# Patient Record
Sex: MALE | Race: BLACK OR AFRICAN AMERICAN
[De-identification: names, ages, dates, MRNs, and addresses within clinical notes are randomized per-mention and may not be internally consistent; named-entity substitution may affect disease eponyms.]

---

## 2017-01-01 ENCOUNTER — HOSPITAL (OUTPATIENT)
Dept: OTHER | Age: 72
End: 2017-01-01
Attending: INTERNAL MEDICINE

## 2017-01-03 LAB
ANNOTATION COMMENT IMP: ABNORMAL
ANNOTATION COMMENT IMP: NEGATIVE
ATP BLD-MCNC: 374 NG/ML
CMV IGG SERPL IA-ACNC: 3.97 ISR
CMV IGM SERPL IA-ACNC: 0.38 OD
EBV VCA IGG SER-ACNC: >8 AI (ref 0–0.8)
EBV VCA IGM SER-ACNC: <0.2 AI (ref 0–0.8)
HAV IGG+IGM SER QL: NEGATIVE
HBV CORE IGG+IGM SER QL: NEGATIVE
HBV SURFACE AB SER QL: POSITIVE
HBV SURFACE AG SER QL: NEGATIVE
HCV AB SERPL QL IA: NEGATIVE
HIV ANTIGEN/ANTIBODY SCREEN: NONREACTIVE
M TB IFN-G CD4+ BCKGRND COR BLD-ACNC: 0.04 UNIT/ML
M TB IFN-G CD4+ T-CELLS BLD-ACNC: 0.03 UNIT/ML
M TB TUBERC IGNF/MITOGEN IGNF CONTROL: >10 UNIT/ML
MEV IGG SER QL IA: 2.67 OD
MUV IGG SER IA-ACNC: 3.92 OD
RPR SER QL: NONREACTIVE
RUBV IGG SERPL IA-ACNC: >500 UNIT/ML
VARICELLA ZOSTER IGG: 3.4 OD

## 2017-02-01 ENCOUNTER — HOSPITAL (OUTPATIENT)
Dept: OTHER | Age: 72
End: 2017-02-01
Attending: INTERNAL MEDICINE

## 2018-06-11 ENCOUNTER — HOSPITAL (OUTPATIENT)
Dept: OTHER | Age: 73
End: 2018-06-11

## 2018-07-01 ENCOUNTER — HOSPITAL (OUTPATIENT)
Dept: OTHER | Age: 73
End: 2018-07-01

## 2018-08-01 ENCOUNTER — HOSPITAL (OUTPATIENT)
Dept: OTHER | Age: 73
End: 2018-08-01

## 2019-10-16 RX ORDER — LISINOPRIL 20 MG/1
TABLET ORAL
COMMUNITY
Start: 2017-08-01 | End: 2019-10-17 | Stop reason: SDUPTHER

## 2019-10-17 ENCOUNTER — OFFICE VISIT (OUTPATIENT)
Dept: FAMILY MEDICINE | Facility: CLINIC | Age: 74
End: 2019-10-17
Payer: MEDICARE

## 2019-10-17 DIAGNOSIS — I10 HYPERTENSION, ESSENTIAL: ICD-10-CM

## 2019-10-17 DIAGNOSIS — E78.5 HYPERLIPIDEMIA, UNSPECIFIED HYPERLIPIDEMIA TYPE: ICD-10-CM

## 2019-10-17 DIAGNOSIS — Z12.11 ENCOUNTER FOR SCREENING COLONOSCOPY: ICD-10-CM

## 2019-10-17 DIAGNOSIS — Z11.59 NEED FOR HEPATITIS C SCREENING TEST: ICD-10-CM

## 2019-10-17 DIAGNOSIS — Z13.220 ENCOUNTER FOR LIPID SCREENING FOR CARDIOVASCULAR DISEASE: ICD-10-CM

## 2019-10-17 DIAGNOSIS — Z13.6 ENCOUNTER FOR LIPID SCREENING FOR CARDIOVASCULAR DISEASE: ICD-10-CM

## 2019-10-17 DIAGNOSIS — Z79.899 ENCOUNTER FOR LONG-TERM (CURRENT) USE OF MEDICATIONS: ICD-10-CM

## 2019-10-17 DIAGNOSIS — Z00.01 ENCOUNTER FOR GENERAL ADULT MEDICAL EXAMINATION WITH ABNORMAL FINDINGS: Primary | ICD-10-CM

## 2019-10-17 PROCEDURE — 99999 PR PBB SHADOW E&M-NEW PATIENT-LVL IV: ICD-10-PCS | Mod: PBBFAC,,, | Performed by: FAMILY MEDICINE

## 2019-10-17 PROCEDURE — 99499 UNLISTED E&M SERVICE: CPT | Mod: S$GLB,,, | Performed by: FAMILY MEDICINE

## 2019-10-17 PROCEDURE — 99999 PR PBB SHADOW E&M-NEW PATIENT-LVL IV: CPT | Mod: PBBFAC,,, | Performed by: FAMILY MEDICINE

## 2019-10-17 PROCEDURE — 99204 PR OFFICE/OUTPT VISIT, NEW, LEVL IV, 45-59 MIN: ICD-10-PCS | Mod: S$GLB,,, | Performed by: FAMILY MEDICINE

## 2019-10-17 PROCEDURE — 99499 RISK ADDL DX/OHS AUDIT: ICD-10-PCS | Mod: S$GLB,,, | Performed by: FAMILY MEDICINE

## 2019-10-17 PROCEDURE — 99204 OFFICE O/P NEW MOD 45 MIN: CPT | Mod: S$GLB,,, | Performed by: FAMILY MEDICINE

## 2019-10-17 RX ORDER — ASCORBIC ACID 500 MG
500 TABLET ORAL DAILY
COMMUNITY

## 2019-10-17 RX ORDER — MAG HYDROX/ALUMINUM HYD/SIMETH 400-400-40
4 SUSPENSION, ORAL (FINAL DOSE FORM) ORAL
COMMUNITY

## 2019-10-17 RX ORDER — ZINC GLUCONATE 50 MG
50 TABLET ORAL DAILY
COMMUNITY

## 2019-10-17 RX ORDER — FOLIC ACID 1 MG/1
1 TABLET ORAL DAILY
COMMUNITY

## 2019-10-17 RX ORDER — LISINOPRIL 20 MG/1
20 TABLET ORAL DAILY
Qty: 90 TABLET | Refills: 3 | Status: SHIPPED | OUTPATIENT
Start: 2019-10-17 | End: 2020-09-23 | Stop reason: SDUPTHER

## 2019-10-17 RX ORDER — ATORVASTATIN CALCIUM 40 MG/1
40 TABLET, FILM COATED ORAL DAILY
Qty: 90 TABLET | Refills: 3 | Status: SHIPPED | OUTPATIENT
Start: 2019-10-17 | End: 2019-10-18

## 2019-10-17 RX ORDER — AMOXICILLIN 500 MG
CAPSULE ORAL DAILY
COMMUNITY

## 2019-10-17 RX ORDER — ASPIRIN 81 MG/1
81 TABLET ORAL DAILY
COMMUNITY
End: 2020-09-23 | Stop reason: SDUPTHER

## 2019-10-17 NOTE — PROGRESS NOTES
======================================================  Patient new to my practice.    Previous PCP: Dr. Austin  Specialists: Dr. Padilla- Urologist  Recent lab work:  Early 2019  Recent imaging: none  Colonoscopy History: none     Health Maintenance Due   Topic Date Due    TETANUS VACCINE  08/26/1965    Colonoscopy  08/26/1997    Pneumococcal Vaccine (65+ Low/Medium Risk) (1 of 2 - PCV13) 08/26/2012       Goal of current visit:  Wellness visit  ======================================================      Subjective:      Patient ID: Trell Rios is a 74 y.o. male.  has a past medical history of Hypertension.     Chief Complaint: Establish Care      Problem List Items Addressed This Visit     Encounter for long-term (current) use of medications    Overview       Patient is on CHRONIC long-term drug therapy for managed conditions. See medication list. Reports compliance.  No side effects reported.  Routine lab work is being monitored.  Patient does need refills today.     Lab Results   Component Value Date    WBC 4.8 10/17/2019    HGB 15.7 10/17/2019    HCT 45.3 10/17/2019    MCV 96.6 10/17/2019     10/17/2019         Chemistry        Component Value Date/Time     10/17/2019 1200    K 4.7 10/17/2019 1200     10/17/2019 1200    CO2 30 10/17/2019 1200    BUN 20 10/17/2019 1200    CREATININE 0.95 10/17/2019 1200    GLU 88 10/17/2019 1200        Component Value Date/Time    CALCIUM 10.1 10/17/2019 1200    ALKPHOS 99 10/17/2019 1200    AST 28 10/17/2019 1200    ALT 43 10/17/2019 1200    BILITOT 0.8 10/17/2019 1200    ESTGFRAFRICA 92 10/17/2019 1200    EGFRNONAA 80 10/17/2019 1200          No results found for: TSH, C0ZRZGU, X6VYBCK, THYROIDAB, FREET4, T3FREE           Current Assessment & Plan     Complete history and physical was completed today.  Complete and thorough medication reconciliation was performed.  Discussed risks and benefits of medications.  Advised patient on orders and health  maintenance.  We discussed old records and old labs if available.  Will request any records not available through epic.  Continue current medications listed on your summary sheet.           Hypertension, essential    Overview       This condition is chronic.  Currently borderline.  Blood pressure is initially above goal.  Patient reports compliance with blood pressure medications as listed.  No side effects are reported at this time.    However patient did not take his blood pressure medication this morning or last night due to fasting.    The patient denies any chest pain, shortness of breath, palpitations, dizziness, lightheadedness, headache, arm numbness tingling or weakness, syncope.    Creatinine   Date Value Ref Range Status   10/17/2019 0.95 0.70 - 1.18 mg/dL Final     Comment:     For patients >49 years of age, the reference limit  for Creatinine is approximately 13% higher for people  identified as -American.                   Current Assessment & Plan     Patient signed up for [facility] blood pressure monitoring.  Continue blood pressure medications.  Encourage compliance.Discussed hypertension disease course.  Discussed-DASH-diet and exercise.  Discussed medication regimen importance of treating high blood pressure.  Patient understood and advised of risk of untreated blood pressure.  ER precautions were given for symptoms of hypertensive urgency and emergency.           Hyperlipidemia    Overview       This condition is chronic.  Currently stable.  Patient reports compliance with hyperlipidemia treatment as prescribed.  No side effects reported at this time.    Lab Results   Component Value Date    CHOL 118 10/17/2019     Lab Results   Component Value Date    HDL 54 10/17/2019     Lab Results   Component Value Date    LDLCALC 41 10/17/2019     Lab Results   Component Value Date    TRIG 145 10/17/2019     Lab Results   Component Value Date    CHOLHDL 2.2 10/17/2019     No results found for:  TOTALCHOLEST      Lab Results   Component Value Date    ALT 43 10/17/2019    AST 28 10/17/2019    ALKPHOS 99 10/17/2019    BILITOT 0.8 10/17/2019       The patient denies any myalgias, chest pain, shortness of breath, abdominal pain, nausea, vomiting, constipation, diarrhea, jaundice, skin changes at this time.           Current Assessment & Plan     After reviewing the patient's numbers and risk scores will decrease patient's Lipitor to 20 mg daily.  Plan to repeat lipid panel in 3 to 6 months.    Discussed hyperlipidemia disease course.  Discussed the risk of cardiovascular disease, increase stroke and heart attack risk.  Patient voiced understanding and understood the treatment plan. All questions were answered.  Discussed healthy diet and increased need for exercise.           Encounter for lipid screening for cardiovascular disease    Need for hepatitis C screening test    Overview     Patient is due for hepatitis C screening.   Patient advised of risk of zeinab hepatitis C including being Born between 1945 and 1965, blood transfusions prior to 1992, IV or nasal drug use, tattoos, sexual intercourse.  Patient denies being tested.  Patient denies known history of hepatitis-C.    Lab Results   Component Value Date    HEPCAB NON-REACTIVE 10/17/2019              Encounter for screening colonoscopy    Overview     Patient elects to Cologuard         Current Assessment & Plan     Cologuard screening.         Encounter for general adult medical examination with abnormal findings - Primary    Overview     Well Adult Physical: Patient here for a comprehensive physical exam.The patient reports problems - Here to update health maintenance.  Do you take any herbs or supplements that were not prescribed by a doctor? no Are you taking calcium supplements? no Are you taking aspirin daily? yes   History:  Any STD's in the past? none     Last PSA was within normal limits.           Current Assessment & Plan     Plan to  recheck PSA in 2020.    Complete history and physical was completed today.  Complete and thorough medication reconciliation was performed.  Discussed risks and benefits of medications.  Advised patient on orders and health maintenance.  We discussed old records and old labs if available.  Will request any records not available through epic.  Continue current medications listed on your summary sheet.                  Past Medical History:  Past Medical History:   Diagnosis Date    Hypertension      Past Surgical History:   Procedure Laterality Date    HERNIA REPAIR       Review of patient's allergies indicates:   Allergen Reactions    Sulfa (sulfonamide antibiotics) Rash     Medication List with Changes/Refills   Current Medications    ASCORBIC ACID, VITAMIN C, (VITAMIN C) 500 MG TABLET    Take 500 mg by mouth once daily.    ASPIRIN (ECOTRIN) 81 MG EC TABLET    Take 81 mg by mouth once daily.    FISH OIL-OMEGA-3 FATTY ACIDS 300-1,000 MG CAPSULE    Take by mouth once daily.    FOLIC ACID (FOLVITE) 1 MG TABLET    Take 1 mg by mouth once daily.    SAW PALMETTO FRUIT 450 MG CAP    Take 4 capsules by mouth.    ZINC GLUCONATE 50 MG TABLET    Take 50 mg by mouth once daily.   Changed and/or Refilled Medications    Modified Medication Previous Medication    ATORVASTATIN (LIPITOR) 20 MG TABLET atorvastatin calcium (ATORVASTATIN ORAL)       Take 1 tablet (20 mg total) by mouth once daily.        LISINOPRIL (PRINIVIL,ZESTRIL) 20 MG TABLET lisinopril (PRINIVIL,ZESTRIL) 20 MG tablet       Take 1 tablet (20 mg total) by mouth once daily.          Social History     Tobacco Use    Smoking status: Never Smoker    Smokeless tobacco: Never Used   Substance Use Topics    Alcohol use: Never     Frequency: Never      History reviewed. No pertinent family history.    I have reviewed the complete PMH, social history, surgical history, allergies and medications.  As well as family history.    Review of Systems   Constitutional:  Negative for activity change and unexpected weight change.   HENT: Negative for hearing loss, rhinorrhea and trouble swallowing.    Eyes: Negative for discharge and visual disturbance.   Respiratory: Negative for chest tightness and wheezing.    Cardiovascular: Negative for chest pain and palpitations.   Gastrointestinal: Negative for blood in stool, constipation, diarrhea and vomiting.   Endocrine: Negative for polydipsia and polyuria.   Genitourinary: Negative for difficulty urinating, hematuria and urgency.   Musculoskeletal: Positive for arthralgias. Negative for joint swelling and neck pain.   Neurological: Negative for weakness and headaches.   Psychiatric/Behavioral: Negative for dysphoric mood.       Objective:     /88 (BP Location: Left arm, Patient Position: Sitting, BP Method: Medium (Automatic)) Comment: did not take medication this morning.  Pulse 63   Temp 97.7 °F (36.5 °C)   Ht 6' (1.829 m)   Wt 83.7 kg (184 lb 9.6 oz)   BMI 25.04 kg/m²     Physical Exam   Constitutional: He is oriented to person, place, and time. He appears well-developed and well-nourished. No distress.   HENT:   Head: Normocephalic and atraumatic.   Eyes: Pupils are equal, round, and reactive to light. EOM are normal.   Neck: Normal range of motion. Neck supple.   Cardiovascular: Normal rate, regular rhythm, normal heart sounds and intact distal pulses.   No murmur heard.  Pulmonary/Chest: Effort normal and breath sounds normal. No respiratory distress. He has no wheezes.   Musculoskeletal: Normal range of motion. He exhibits no edema.   Neurological: He is alert and oriented to person, place, and time. No cranial nerve deficit.   Skin: Skin is warm and dry. Capillary refill takes less than 2 seconds.   Psychiatric: He has a normal mood and affect. His behavior is normal.   Nursing note and vitals reviewed.      Assessment:     1. Encounter for general adult medical examination with abnormal findings    2. Encounter for  long-term (current) use of medications    3. Hypertension, essential    4. Hyperlipidemia, unspecified hyperlipidemia type    5. Encounter for lipid screening for cardiovascular disease    6. Need for hepatitis C screening test    7. Encounter for screening colonoscopy        Plan:     I have Reviewed and summarized old records.  I have performed thorough medication reconciliation today and discussed risk and benefits of each medication.  I have reviewed labs and discussed with patient.  All questions were answered.  I am requesting old records and will review them once they are available.    Problem List Items Addressed This Visit     Encounter for long-term (current) use of medications     Complete history and physical was completed today.  Complete and thorough medication reconciliation was performed.  Discussed risks and benefits of medications.  Advised patient on orders and health maintenance.  We discussed old records and old labs if available.  Will request any records not available through epic.  Continue current medications listed on your summary sheet.           Relevant Orders    Hepatitis C antibody (Completed)    CBC auto differential (Completed)    Comprehensive metabolic panel (Completed)    Hypertension, essential     Patient signed up for [facility] blood pressure monitoring.  Continue blood pressure medications.  Encourage compliance.Discussed hypertension disease course.  Discussed-DASH-diet and exercise.  Discussed medication regimen importance of treating high blood pressure.  Patient understood and advised of risk of untreated blood pressure.  ER precautions were given for symptoms of hypertensive urgency and emergency.           Relevant Medications    lisinopril (PRINIVIL,ZESTRIL) 20 MG tablet    Other Relevant Orders    MyChart Patient Entered Blood Pressure    Hepatitis C antibody (Completed)    CBC auto differential (Completed)    Comprehensive metabolic panel (Completed)    Lipid panel  (Completed)    Hyperlipidemia     After reviewing the patient's numbers and risk scores will decrease patient's Lipitor to 20 mg daily.  Plan to repeat lipid panel in 3 to 6 months.    Discussed hyperlipidemia disease course.  Discussed the risk of cardiovascular disease, increase stroke and heart attack risk.  Patient voiced understanding and understood the treatment plan. All questions were answered.  Discussed healthy diet and increased need for exercise.           Relevant Orders    Lipid panel (Completed)    Encounter for lipid screening for cardiovascular disease    Need for hepatitis C screening test    Relevant Orders    Hepatitis C antibody (Completed)    Encounter for screening colonoscopy     Cologuard screening.         Relevant Orders    Cologuard Screening (Multitarget Stool DNA)    Encounter for general adult medical examination with abnormal findings - Primary     Plan to recheck PSA in 2020.    Complete history and physical was completed today.  Complete and thorough medication reconciliation was performed.  Discussed risks and benefits of medications.  Advised patient on orders and health maintenance.  We discussed old records and old labs if available.  Will request any records not available through epic.  Continue current medications listed on your summary sheet.                 Future Appointments     Date Provider Specialty Appt Notes    4/17/2020 Elias Rowan MD Family Medicine 6 month F/U            Follow up in about 6 months (around 4/17/2020), or if symptoms worsen or fail to improve, for HTN, HLD.    If no improvement in symptoms or symptoms worsen, advised to call/follow-up at clinic or go to ER. Patient voiced understanding and all questions/concerns were addressed.     DISCLAIMER: This note was compiled by using a speech recognition dictation system and therefore please be aware that typographical / speech recognition errors can and do occur.  Please contact me if you see any  errors specifically.    Elias Rowan MD  We Offer Telehealth & Same Day Appointments!   Book your Telehealth appointment with me through my nurse or   Clinic appointments on [employer]!    Office: [phone]          Check out my Facebook Page and Follow Me at: CLICK HERE    Check out my website at [employer] by clicking on: CLICK HERE    To Schedule appointments online, go to [employer]: CLICK HERE     Location: [email or web address]    [address] [place] [address]    FAX: [phone]

## 2019-10-17 NOTE — Clinical Note
Patient needs Cologuard sent if not already done.  We need records from [facility] for labs done earlier this year.

## 2019-10-18 DIAGNOSIS — Z79.899 ENCOUNTER FOR LONG-TERM (CURRENT) USE OF MEDICATIONS: Primary | ICD-10-CM

## 2019-10-18 DIAGNOSIS — Z00.01 ENCOUNTER FOR GENERAL ADULT MEDICAL EXAMINATION WITH ABNORMAL FINDINGS: ICD-10-CM

## 2019-10-18 DIAGNOSIS — E78.5 HYPERLIPIDEMIA, UNSPECIFIED HYPERLIPIDEMIA TYPE: ICD-10-CM

## 2019-10-18 LAB
ALBUMIN SERPL-MCNC: 4.6 G/DL (ref 3.6–5.1)
ALBUMIN/GLOB SERPL: 1.6 (CALC) (ref 1–2.5)
ALP SERPL-CCNC: 99 U/L (ref 40–115)
ALT SERPL-CCNC: 43 U/L (ref 9–46)
AST SERPL-CCNC: 28 U/L (ref 10–35)
BASOPHILS # BLD AUTO: 53 CELLS/UL (ref 0–200)
BASOPHILS NFR BLD AUTO: 1.1 %
BILIRUB SERPL-MCNC: 0.8 MG/DL (ref 0.2–1.2)
BUN SERPL-MCNC: 20 MG/DL (ref 7–25)
BUN/CREAT SERPL: NORMAL (CALC) (ref 6–22)
CALCIUM SERPL-MCNC: 10.1 MG/DL (ref 8.6–10.3)
CHLORIDE SERPL-SCNC: 102 MMOL/L (ref 98–110)
CHOLEST SERPL-MCNC: 118 MG/DL
CHOLEST/HDLC SERPL: 2.2 (CALC)
CO2 SERPL-SCNC: 30 MMOL/L (ref 20–32)
CREAT SERPL-MCNC: 0.95 MG/DL (ref 0.7–1.18)
EOSINOPHIL # BLD AUTO: 130 CELLS/UL (ref 15–500)
EOSINOPHIL NFR BLD AUTO: 2.7 %
ERYTHROCYTE [DISTWIDTH] IN BLOOD BY AUTOMATED COUNT: 12.8 % (ref 11–15)
GFRSERPLBLD MDRD-ARVRAT: 80 ML/MIN/1.73M2
GLOBULIN SER CALC-MCNC: 2.9 G/DL (CALC) (ref 1.9–3.7)
GLUCOSE SERPL-MCNC: 88 MG/DL (ref 65–99)
HCT VFR BLD AUTO: 45.3 % (ref 38.5–50)
HCV AB S/CO SERPL IA: 0.02
HCV AB SERPL QL IA: NORMAL
HDLC SERPL-MCNC: 54 MG/DL
HGB BLD-MCNC: 15.7 G/DL (ref 13.2–17.1)
LDLC SERPL CALC-MCNC: 41 MG/DL (CALC)
LYMPHOCYTES # BLD AUTO: 1656 CELLS/UL (ref 850–3900)
LYMPHOCYTES NFR BLD AUTO: 34.5 %
MCH RBC QN AUTO: 33.5 PG (ref 27–33)
MCHC RBC AUTO-ENTMCNC: 34.7 G/DL (ref 32–36)
MCV RBC AUTO: 96.6 FL (ref 80–100)
MONOCYTES # BLD AUTO: 566 CELLS/UL (ref 200–950)
MONOCYTES NFR BLD AUTO: 11.8 %
NEUTROPHILS # BLD AUTO: 2395 CELLS/UL (ref 1500–7800)
NEUTROPHILS NFR BLD AUTO: 49.9 %
NONHDLC SERPL-MCNC: 64 MG/DL (CALC)
PLATELET # BLD AUTO: 205 THOUSAND/UL (ref 140–400)
PMV BLD REES-ECKER: 9.8 FL (ref 7.5–12.5)
POTASSIUM SERPL-SCNC: 4.7 MMOL/L (ref 3.5–5.3)
PROT SERPL-MCNC: 7.5 G/DL (ref 6.1–8.1)
RBC # BLD AUTO: 4.69 MILLION/UL (ref 4.2–5.8)
SODIUM SERPL-SCNC: 140 MMOL/L (ref 135–146)
TRIGL SERPL-MCNC: 145 MG/DL
WBC # BLD AUTO: 4.8 THOUSAND/UL (ref 3.8–10.8)

## 2019-10-18 RX ORDER — ATORVASTATIN CALCIUM 20 MG/1
20 TABLET, FILM COATED ORAL DAILY
Qty: 90 TABLET | Refills: 3 | Status: SHIPPED | OUTPATIENT
Start: 2019-10-18 | End: 2020-09-23 | Stop reason: SDUPTHER

## 2019-10-18 NOTE — PROGRESS NOTES
CALL THE PATIENT to discuss results and document verification.   Make sure that Cologuard form was submitted.  Set up lipid panel for six months.    I have sent a message to them with the interpretation (see below).  See if they have any questions and make a follow-up appointment if not already schedule and if needed.    Also please address any outstanding health maintenance that may be due: TETANUS VACCINE due on 08/26/1965  Colonoscopy due on 08/26/1997  Pneumococcal Vaccine (65+ Low/Medium Risk)(1 of 2 - PCV13) due on 08/26/2012  ====================================    My interpretation that was sent to them through [employer]:    I have reviewed your recent blood work.  Excellent blood work!    Your complete blood count is stable within normal limits.    Your metabolic panel which shows your glucose, kidney function, electrolytes, and liver function is stable and within normal limits.   Your cholesterol is greatly improved..  Your LDL is too low.  Decrease atorvastatin to 20 mg daily.  Recheck levels in six months    Please reply so that we know that you have received this message.

## 2019-10-26 VITALS
WEIGHT: 184.63 LBS | SYSTOLIC BLOOD PRESSURE: 138 MMHG | HEIGHT: 72 IN | BODY MASS INDEX: 25.01 KG/M2 | HEART RATE: 63 BPM | DIASTOLIC BLOOD PRESSURE: 88 MMHG | TEMPERATURE: 98 F

## 2019-10-26 PROBLEM — Z00.01 ENCOUNTER FOR GENERAL ADULT MEDICAL EXAMINATION WITH ABNORMAL FINDINGS: Status: ACTIVE | Noted: 2019-10-26

## 2019-10-26 NOTE — ASSESSMENT & PLAN NOTE
[facility]  Continue blood pressure medications.  Encourage compliance.Discussed hypertension disease course.  Discussed-DASH-diet and exercise.  Discussed medication regimen importance of treating high blood pressure.  Patient understood and advised of risk of untreated blood pressure.  ER precautions were given for symptoms of hypertensive urgency and emergency.

## 2019-10-26 NOTE — ASSESSMENT & PLAN NOTE
Plan to recheck PSA in 2020.    Complete history and physical was completed today.  Complete and thorough medication reconciliation was performed.  Discussed risks and benefits of medications.  Advised patient on orders and health maintenance.  We discussed old records and old labs if available.  Will request any records not available through epic.  Continue current medications listed on your summary sheet.

## 2019-10-26 NOTE — ASSESSMENT & PLAN NOTE
After reviewing the patient's numbers and risk scores will decrease patient's Lipitor to 20 mg daily.  Plan to repeat lipid panel in 3 to 6 months.    Discussed hyperlipidemia disease course.  Discussed the risk of cardiovascular disease, increase stroke and heart attack risk.  Patient voiced understanding and understood the treatment plan. All questions were answered.  Discussed healthy diet and increased need for exercise.

## 2019-12-10 DIAGNOSIS — Z79.899 ENCOUNTER FOR LONG-TERM (CURRENT) USE OF MEDICATIONS: Primary | ICD-10-CM

## 2019-12-10 RX ORDER — CEFDINIR 300 MG/1
300 CAPSULE ORAL 2 TIMES DAILY
Qty: 28 CAPSULE | Refills: 0 | Status: SHIPPED | OUTPATIENT
Start: 2019-12-10 | End: 2020-02-01 | Stop reason: ALTCHOICE

## 2020-01-20 PROBLEM — Z13.220 ENCOUNTER FOR LIPID SCREENING FOR CARDIOVASCULAR DISEASE: Status: RESOLVED | Noted: 2019-10-17 | Resolved: 2020-01-20

## 2020-01-20 PROBLEM — Z13.6 ENCOUNTER FOR LIPID SCREENING FOR CARDIOVASCULAR DISEASE: Status: RESOLVED | Noted: 2019-10-17 | Resolved: 2020-01-20

## 2020-01-20 LAB — HEMOCCULT STL QL IA: NEGATIVE

## 2020-01-27 PROBLEM — Z00.01 ENCOUNTER FOR GENERAL ADULT MEDICAL EXAMINATION WITH ABNORMAL FINDINGS: Status: RESOLVED | Noted: 2019-10-26 | Resolved: 2020-01-27

## 2020-01-29 ENCOUNTER — PATIENT OUTREACH (OUTPATIENT)
Dept: ADMINISTRATIVE | Facility: HOSPITAL | Age: 75
End: 2020-01-29

## 2020-02-01 DIAGNOSIS — A49.9 BACTERIAL INFECTION: Primary | ICD-10-CM

## 2020-02-01 RX ORDER — NITROFURANTOIN 25; 75 MG/1; MG/1
100 CAPSULE ORAL 2 TIMES DAILY
Qty: 28 CAPSULE | Refills: 0 | Status: SHIPPED | OUTPATIENT
Start: 2020-02-01 | End: 2020-02-04 | Stop reason: ALTCHOICE

## 2020-04-02 ENCOUNTER — PATIENT MESSAGE (OUTPATIENT)
Dept: FAMILY MEDICINE | Facility: CLINIC | Age: 75
End: 2020-04-02

## 2020-04-16 DIAGNOSIS — Z79.899 ENCOUNTER FOR LONG-TERM (CURRENT) USE OF MEDICATIONS: Primary | ICD-10-CM

## 2020-04-16 DIAGNOSIS — Z13.220 ENCOUNTER FOR LIPID SCREENING FOR CARDIOVASCULAR DISEASE: ICD-10-CM

## 2020-04-16 DIAGNOSIS — Z13.6 ENCOUNTER FOR LIPID SCREENING FOR CARDIOVASCULAR DISEASE: ICD-10-CM

## 2020-04-16 DIAGNOSIS — Z00.01 ENCOUNTER FOR GENERAL ADULT MEDICAL EXAMINATION WITH ABNORMAL FINDINGS: ICD-10-CM

## 2020-07-20 PROBLEM — Z00.01 ENCOUNTER FOR GENERAL ADULT MEDICAL EXAMINATION WITH ABNORMAL FINDINGS: Status: RESOLVED | Noted: 2019-10-26 | Resolved: 2020-07-20

## 2020-09-23 ENCOUNTER — OFFICE VISIT (OUTPATIENT)
Dept: FAMILY MEDICINE | Facility: CLINIC | Age: 75
End: 2020-09-23
Payer: MEDICARE

## 2020-09-23 DIAGNOSIS — E78.5 HYPERLIPIDEMIA, UNSPECIFIED HYPERLIPIDEMIA TYPE: ICD-10-CM

## 2020-09-23 DIAGNOSIS — Z13.220 ENCOUNTER FOR LIPID SCREENING FOR CARDIOVASCULAR DISEASE: ICD-10-CM

## 2020-09-23 DIAGNOSIS — Z12.5 ENCOUNTER FOR PROSTATE CANCER SCREENING: ICD-10-CM

## 2020-09-23 DIAGNOSIS — Z13.6 ENCOUNTER FOR LIPID SCREENING FOR CARDIOVASCULAR DISEASE: ICD-10-CM

## 2020-09-23 DIAGNOSIS — Z00.00 WELL ADULT EXAM: Primary | ICD-10-CM

## 2020-09-23 DIAGNOSIS — I10 HYPERTENSION, ESSENTIAL: ICD-10-CM

## 2020-09-23 DIAGNOSIS — Z79.899 ENCOUNTER FOR LONG-TERM (CURRENT) USE OF MEDICATIONS: ICD-10-CM

## 2020-09-23 PROBLEM — Z12.11 ENCOUNTER FOR SCREENING COLONOSCOPY: Status: RESOLVED | Noted: 2019-10-17 | Resolved: 2020-09-23

## 2020-09-23 PROBLEM — Z11.59 NEED FOR HEPATITIS C SCREENING TEST: Status: RESOLVED | Noted: 2019-10-17 | Resolved: 2020-09-23

## 2020-09-23 PROCEDURE — 99441 PR PHYSICIAN TELEPHONE EVALUATION 5-10 MIN: ICD-10-PCS | Mod: 95,,, | Performed by: FAMILY MEDICINE

## 2020-09-23 PROCEDURE — 99441 PR PHYSICIAN TELEPHONE EVALUATION 5-10 MIN: CPT | Mod: 95,,, | Performed by: FAMILY MEDICINE

## 2020-09-23 RX ORDER — ATORVASTATIN CALCIUM 20 MG/1
20 TABLET, FILM COATED ORAL DAILY
Qty: 90 TABLET | Refills: 4 | Status: SHIPPED | OUTPATIENT
Start: 2020-09-23 | End: 2021-10-22 | Stop reason: SDUPTHER

## 2020-09-23 RX ORDER — ASPIRIN 81 MG/1
81 TABLET ORAL DAILY
Qty: 90 TABLET | Refills: 11 | Status: SHIPPED | OUTPATIENT
Start: 2020-09-23 | End: 2021-10-22 | Stop reason: SDUPTHER

## 2020-09-23 RX ORDER — LISINOPRIL 20 MG/1
20 TABLET ORAL DAILY
Qty: 90 TABLET | Refills: 4 | Status: SHIPPED | OUTPATIENT
Start: 2020-09-23 | End: 2021-10-18

## 2020-09-23 NOTE — PATIENT INSTRUCTIONS
Follow up in about 1 year (around 9/23/2021), or if symptoms worsen or fail to improve, for Annual Wellness Exam.     If no improvement in symptoms or symptoms worsen, please be advised to call MD, follow-up at clinic and/or go to ER if becomes severe.    Elias Rowan M.D.        We Offer TELEHEALTH & Same Day Appointments!   Book your Telehealth appointment with me through my nurse or   Clinic appointments on [employer]!    [address] [place] [address]    Office: [phone]   FAX: [phone]    Check out my Facebook Page and Follow Me at: [email or web address]    Check out my website at [employer] by clicking on: [email or web address]    To Schedule appointments online, go to [employer]: [email or web address]

## 2020-09-23 NOTE — PROGRESS NOTES
Primary Care Telemedicine Note    The patient location is:  Patient Home - [place]  The chief complaint leading to consultation is:   Chief Complaint   Patient presents with    Annual Exam      Visit type: Virtual visit with synchronous audio only and video  Each patient to whom he or she provides medical services by telemedicine is:  (1) informed of the relationship between the physician and patient and the respective role of any other health care provider with respect to management of the patient; and (2) notified that he or she may decline to receive medical services by telemedicine and may withdraw from such care at any time.  =================================================================  This note is specifically for wellness visit performed today.     WELLNESS EXAM      Patient ID: Trell Rios is a 75 y.o. male.  has a past medical history of Encounter for long-term (current) use of medications (10/17/2019), Hyperlipidemia (10/17/2019), and Hypertension.     Chief Complaint:  Encounter for wellness exam    Well Adult Physical: Patient here for a comprehensive physical exam.The patient reports NO problems.  Do you take any herbs or supplements that were not prescribed by a doctor? no   Are you taking calcium supplements? no      History:  None  UROLOGIST:  None    Date last PSA:  None available  No results found for: PSA   No history of STDs    Health Maintenance Topics with due status: Not Due       Topic Last Completion Date    Lipid Panel 10/17/2019    Colorectal Cancer Screening 01/20/2020    negative Cologuard.  Repeat in three years.    ==============================================  History reviewed.     Health Maintenance Due   Topic Date Due    TETANUS VACCINE  08[birth year]26/1965    Shingles Vaccine (1 of 2) 08/26/1997    Pneumococcal Vaccine (65+ Low/Medium Risk) (1 of 2 - PCV13) 08/26/2012     Patient gets vaccines from his work.  Past Medical History:  Past Medical History:   Diagnosis Date     Encounter for long-term (current) use of medications 10/17/2019     Patient is on CHRONIC long-term drug therapy for managed conditions. See medication list. Reports compliance.  No side effects reported.  Routine lab work is being monitored.  Patient does need refills today.   Lab Results Component Value Date  WBC 4.8 10/17/2019  HGB 15.7 10/17/2019  HCT 45.3 10/17/2019  MCV 96.6 10/17/2019   10/17/2019     Chemistry    Component Value Date/Time   1    Hyperlipidemia 10/17/2019     This condition is chronic.  Currently stable.  Patient reports compliance with hyperlipidemia treatment as prescribed.  No side effects reported at this time.  Lab Results Component Value Date  CHOL 118 10/17/2019  Lab Results Component Value Date  HDL 54 10/17/2019  Lab Results Component Value Date  LDLCALC 41 10/17/2019  Lab Results Component Value Date  TRIG 145 10/17/2019  Lab Results Compone    Hypertension      Past Surgical History:   Procedure Laterality Date    HERNIA REPAIR       Review of patient's allergies indicates:   Allergen Reactions    Sulfa (sulfonamide antibiotics) Rash     Current Outpatient Medications on File Prior to Visit   Medication Sig Dispense Refill    ascorbic acid, vitamin C, (VITAMIN C) 500 MG tablet Take 500 mg by mouth once daily.      fish oil-omega-3 fatty acids 300-1,000 mg capsule Take by mouth once daily.      folic acid (FOLVITE) 1 MG tablet Take 1 mg by mouth once daily.      saw palmetto fruit 450 mg Cap Take 4 capsules by mouth.      zinc gluconate 50 mg tablet Take 50 mg by mouth once daily.      [DISCONTINUED] aspirin (ECOTRIN) 81 MG EC tablet Take 81 mg by mouth once daily.      [DISCONTINUED] atorvastatin (LIPITOR) 20 MG tablet Take 1 tablet (20 mg total) by mouth once daily. 90 tablet 3    [DISCONTINUED] lisinopril (PRINIVIL,ZESTRIL) 20 MG tablet Take 1 tablet (20 mg total) by mouth once daily. 90 tablet 3     No current facility-administered medications on  file prior to visit.      Social History     Socioeconomic History    Marital status:      Spouse name: Not on file    Number of children: Not on file    Years of education: Not on file    Highest education level: Not on file   Occupational History    Not on file   Social Needs    Financial resource strain: Not on file    Food insecurity     Worry: Not on file     Inability: Not on file    Transportation needs     Medical: Not on file     Non-medical: Not on file   Tobacco Use    Smoking status: Never Smoker    Smokeless tobacco: Never Used   Substance and Sexual Activity    Alcohol use: Never     Frequency: Never    Drug use: Never    Sexual activity: Yes     Partners: Female     Birth control/protection: Post-menopausal   Lifestyle    Physical activity     Days per week: Not on file     Minutes per session: Not on file    Stress: Not at all   Relationships    Social connections     Talks on phone: Not on file     Gets together: Not on file     Attends [employer] service: Not on file     Active member of club or organization: Not on file     Attends meetings of clubs or organizations: Not on file     Relationship status: Not on file   Other Topics Concern    Not on file   Social History Narrative    Not on file     History reviewed. No pertinent family history.    There were no vitals filed for this visit.   There is no height or weight on file to calculate BMI.     Review of Systems   Constitutional: Negative for activity change and unexpected weight change.   HENT: Negative for hearing loss, rhinorrhea and trouble swallowing.    Eyes: Negative for discharge and visual disturbance.   Respiratory: Negative for chest tightness and wheezing.    Cardiovascular: Negative for chest pain and palpitations.   Gastrointestinal: Negative for blood in stool, constipation, diarrhea and vomiting.   Endocrine: Negative for polydipsia and polyuria.   Genitourinary: Negative for difficulty urinating,  hematuria and urgency.   Musculoskeletal: Negative for arthralgias, joint swelling and neck pain.   Neurological: Negative for weakness and headaches.   Psychiatric/Behavioral: Negative for confusion and dysphoric mood.          Objective:      Physical Exam  Constitutional:       General: He is not in acute distress.     Appearance: He is well-developed. He is not diaphoretic.   HENT:      Head: Normocephalic and atraumatic.   Eyes:      Pupils: Pupils are equal, round, and reactive to light.   Neck:      Musculoskeletal: Normal range of motion.   Pulmonary:      Effort: Pulmonary effort is normal.   Musculoskeletal: Normal range of motion.   Skin:     Findings: No rash.   Neurological:      Mental Status: He is alert and oriented to person, place, and time.   Psychiatric:         Attention and Perception: He is attentive.         Mood and Affect: Mood is not anxious or depressed. Affect is not labile, blunt, angry or inappropriate.         Speech: He is communicative. Speech is not rapid and pressured, delayed, slurred or tangential.         Behavior: Behavior normal. Behavior is not agitated, slowed, aggressive, withdrawn, hyperactive or combative.         Thought Content: Thought content normal. Thought content is not paranoid or delusional. Thought content does not include homicidal or suicidal ideation. Thought content does not include homicidal or suicidal plan.         Cognition and Memory: Memory is not impaired.         Judgment: Judgment normal. Judgment is not impulsive or inappropriate.          Assessment / Plan:      1.  Routine health exam-patient here for annual wellness visit.  Labs ordered.  Health maintenance was reviewed and ordered.    Complete history and limited telemedicine physical was completed today.  Complete and thorough medication reconciliation was performed.  Discussed risks and benefits of medications.  Advised patient on orders and health maintenance.  We discussed old records and  old labs if available.  Will request any records not available through epic.  Continue current medications listed on your summary sheet.    All questions were answered. Patient had no further concerns. Advised of diagnoses and plan. Follow up as planned or return sooner if symptoms persist or worsen.     Orders Placed This Encounter   Procedures    Lipid Panel     Standing Status:   Future     Number of Occurrences:   1     Standing Expiration Date:   11/22/2021    Hemoglobin A1C     Standing Status:   Future     Number of Occurrences:   1     Standing Expiration Date:   11/22/2021    CBC Without Differential     Standing Status:   Future     Number of Occurrences:   1     Standing Expiration Date:   11/22/2021    Comprehensive metabolic panel     Standing Status:   Future     Number of Occurrences:   1     Standing Expiration Date:   11/22/2021    Urinalysis     Standing Status:   Future     Standing Expiration Date:   11/22/2021    TSH     Standing Status:   Future     Number of Occurrences:   1     Standing Expiration Date:   11/22/2021    PSA, Screening     Standing Status:   Future     Number of Occurrences:   1     Standing Expiration Date:   11/22/2021    MyChart Patient Entered Pulse     Order Specific Question:   After how many days would you like to receive a notification of this patient's flowsheet entries?     Answer:   30    MyChart Patient Entered Weight     Please track your weight day so that I can review the daily control that you have.  This will be uploaded to your EPIC chart at our office so that we can care for you better.  Dr. Jim Webster     Order Specific Question:   After how many days would you like to receive a notification of this patient's flowsheet entries?     Answer:   30       Medications Ordered This Encounter   Medications    aspirin (ECOTRIN) 81 MG EC tablet     Sig: Take 1 tablet (81 mg total) by mouth once daily.     Dispense:  90 tablet     Refill:  11     atorvastatin (LIPITOR) 20 MG tablet     Sig: Take 1 tablet (20 mg total) by mouth once daily.     Dispense:  90 tablet     Refill:  4     Changing dose to 20mg, cancel 40mg Rx    lisinopriL (PRINIVIL,ZESTRIL) 20 MG tablet     Sig: Take 1 tablet (20 mg total) by mouth once daily.     Dispense:  90 tablet     Refill:  4     .        Elias Rowan MD

## 2020-10-28 DIAGNOSIS — A49.9 BACTERIAL INFECTION: Primary | ICD-10-CM

## 2020-10-28 RX ORDER — AMOXICILLIN 875 MG/1
875 TABLET, FILM COATED ORAL 2 TIMES DAILY
Qty: 30 TABLET | Refills: 1 | Status: SHIPPED | OUTPATIENT
Start: 2020-10-28 | End: 2021-09-16

## 2020-12-29 ENCOUNTER — OFFICE VISIT (OUTPATIENT)
Dept: FAMILY MEDICINE | Facility: CLINIC | Age: 75
End: 2020-12-29
Payer: MEDICARE

## 2020-12-29 VITALS — DIASTOLIC BLOOD PRESSURE: 80 MMHG | SYSTOLIC BLOOD PRESSURE: 130 MMHG

## 2020-12-29 DIAGNOSIS — Z13.6 ENCOUNTER FOR LIPID SCREENING FOR CARDIOVASCULAR DISEASE: ICD-10-CM

## 2020-12-29 DIAGNOSIS — I10 HYPERTENSION, ESSENTIAL: Primary | ICD-10-CM

## 2020-12-29 DIAGNOSIS — Z00.00 WELL ADULT EXAM: ICD-10-CM

## 2020-12-29 DIAGNOSIS — Z79.899 ENCOUNTER FOR LONG-TERM (CURRENT) USE OF MEDICATIONS: ICD-10-CM

## 2020-12-29 DIAGNOSIS — E53.8 DEFICIENCY OF OTHER SPECIFIED B GROUP VITAMINS: ICD-10-CM

## 2020-12-29 DIAGNOSIS — Z13.220 ENCOUNTER FOR LIPID SCREENING FOR CARDIOVASCULAR DISEASE: ICD-10-CM

## 2020-12-29 DIAGNOSIS — Z11.59 ENCOUNTER FOR SCREENING FOR OTHER VIRAL DISEASES: ICD-10-CM

## 2020-12-29 DIAGNOSIS — E55.9 VITAMIN D DEFICIENCY, UNSPECIFIED: ICD-10-CM

## 2020-12-29 PROCEDURE — 3075F SYST BP GE 130 - 139MM HG: CPT | Mod: CPTII,95,, | Performed by: FAMILY MEDICINE

## 2020-12-29 PROCEDURE — 1159F MED LIST DOCD IN RCRD: CPT | Mod: 95,,, | Performed by: FAMILY MEDICINE

## 2020-12-29 PROCEDURE — 99213 PR OFFICE/OUTPT VISIT, EST, LEVL III, 20-29 MIN: ICD-10-PCS | Mod: 95,,, | Performed by: FAMILY MEDICINE

## 2020-12-29 PROCEDURE — 3079F DIAST BP 80-89 MM HG: CPT | Mod: CPTII,95,, | Performed by: FAMILY MEDICINE

## 2020-12-29 PROCEDURE — 99213 OFFICE O/P EST LOW 20 MIN: CPT | Mod: 95,,, | Performed by: FAMILY MEDICINE

## 2020-12-29 PROCEDURE — 3075F PR MOST RECENT SYSTOLIC BLOOD PRESS GE 130-139MM HG: ICD-10-PCS | Mod: CPTII,95,, | Performed by: FAMILY MEDICINE

## 2020-12-29 PROCEDURE — 1159F PR MEDICATION LIST DOCUMENTED IN MEDICAL RECORD: ICD-10-PCS | Mod: 95,,, | Performed by: FAMILY MEDICINE

## 2020-12-29 PROCEDURE — 3079F PR MOST RECENT DIASTOLIC BLOOD PRESSURE 80-89 MM HG: ICD-10-PCS | Mod: CPTII,95,, | Performed by: FAMILY MEDICINE

## 2020-12-29 RX ORDER — INFLUENZA A VIRUS A/MICHIGAN/45/2015 X-275 (H1N1) ANTIGEN (FORMALDEHYDE INACTIVATED), INFLUENZA A VIRUS A/SINGAPORE/INFIMH-16-0019/2016 IVR-186 (H3N2) ANTIGEN (FORMALDEHYDE INACTIVATED), INFLUENZA B VIRUS B/PHUKET/3073/2013 ANTIGEN (FORMALDEHYDE INACTIVATED), AND INFLUENZA B VIRUS B/MARYLAND/15/2016 BX-69A ANTIGEN (FORMALDEHYDE INACTIVATED) 60; 60; 60; 60 UG/.7ML; UG/.7ML; UG/.7ML; UG/.7ML
INJECTION, SUSPENSION INTRAMUSCULAR
COMMUNITY
Start: 2020-10-06 | End: 2021-10-22

## 2020-12-29 NOTE — PROGRESS NOTES
Primary Care Telemedicine Note    The patient location is:  Patient Home - [place]  The chief complaint leading to consultation is:   Chief Complaint   Patient presents with    Hypertension      Total time spent with patient:  7 min    Visit type: Virtual visit with synchronous audio only and video  Each patient to whom he or she provides medical services by telemedicine is:  (1) informed of the relationship between the physician and patient and the respective role of any other health care provider with respect to management of the patient; and (2) notified that he or she may decline to receive medical services by telemedicine and may withdraw from such care at any time.  =================================================================    ==========================================================================  Subjective/Assessment/ Plan:      Patient ID: Trell Rios is a 75 y.o. male.  has a past medical history of Encounter for long-term (current) use of medications (10/17/2019), Hyperlipidemia (10/17/2019), and Hypertension.   Chief Complaint: Hypertension    Problem List Items Addressed This Visit     Encounter for long-term (current) use of medications    Overview       Patient is on CHRONIC long-term drug therapy for managed conditions. See medication list. Reports compliance.  No side effects reported.  Routine lab work is being monitored.  Patient does need refills today.     Lab Results   Component Value Date    WBC 4.8 10/17/2019    HGB 15.7 10/17/2019    HCT 45.3 10/17/2019    MCV 96.6 10/17/2019     10/17/2019         Chemistry        Component Value Date/Time     10/17/2019 1200    K 4.7 10/17/2019 1200     10/17/2019 1200    CO2 30 10/17/2019 1200    BUN 20 10/17/2019 1200    CREATININE 0.95 10/17/2019 1200    GLU 88 10/17/2019 1200        Component Value Date/Time    CALCIUM 10.1 10/17/2019 1200    ALKPHOS 99 10/17/2019 1200    AST 28 10/17/2019 1200    ALT 43 10/17/2019  1200    BILITOT 0.8 10/17/2019 1200    ESTGFRAFRICA 92 10/17/2019 1200    EGFRNONAA 80 10/17/2019 1200          No results found for: TSH, B3QIOKK, Z5TRPBN, THYROIDAB, FREET4, T3FREE           Hypertension, essential - Primary    Overview     December 2020:  Patient reports blood pressure log has been averaging 130/80.  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  Blood pressure cuff has been validated.  CHRONIC. STABLE. BP Reviewed.  Compliant with BP medications. No SE reported.   (-) CP, SOB, palpitations, dizziness, lightheadedness, HA, arm numbness, tingling or weakness, syncope.  Creatinine   Date Value Ref Range Status   10/17/2019 0.95 0.70 - 1.18 mg/dL Final     Comment:     For patients >49 years of age, the reference limit  for Creatinine is approximately 13% higher for people  identified as -American.                   Current Assessment & Plan     Continue lisinopril 20 mg daily.Counseled on importance of hypertension disease course, I recommend ongoing Education for DASH-diet and exercise.  Counseled on medication regimen importance of treating high blood pressure.  Please be advised of risk of untreated blood pressure as discussed.  Please advised of ER precautions were given for symptoms of hypertensive urgency and emergency.             Other Visit Diagnoses     Well adult exam        Encounter for lipid screening for cardiovascular disease        Encounter for screening for other viral diseases        Vitamin D deficiency, unspecified         Deficiency of other specified B group vitamins              I have reviewed the complete PMH, Family History, social history, surgical history, allergies and medications per Epic record at the time of this visit.  Review of Systems   Constitutional: Negative for activity change and unexpected weight change.   HENT: Negative for hearing loss, rhinorrhea and trouble swallowing.    Eyes: Negative for discharge and visual  disturbance.   Respiratory: Negative for chest tightness and wheezing.    Cardiovascular: Negative for chest pain and palpitations.   Gastrointestinal: Negative for blood in stool, constipation, diarrhea and vomiting.   Endocrine: Negative for polydipsia and polyuria.   Genitourinary: Negative for difficulty urinating, hematuria and urgency.   Musculoskeletal: Negative for arthralgias, joint swelling and neck pain.   Neurological: Negative for weakness and headaches.   Psychiatric/Behavioral: Negative for confusion and dysphoric mood.    see HPI otherwise negative  Objective   /80   Physical Exam  Constitutional:       General: He is not in acute distress.     Appearance: He is well-developed. He is not diaphoretic.   HENT:      Head: Normocephalic and atraumatic.   Eyes:      Pupils: Pupils are equal, round, and reactive to light.   Neck:      Musculoskeletal: Normal range of motion.   Pulmonary:      Effort: Pulmonary effort is normal.   Musculoskeletal: Normal range of motion.   Skin:     Findings: No rash.   Neurological:      Mental Status: He is alert and oriented to person, place, and time.   Psychiatric:         Attention and Perception: He is attentive.         Mood and Affect: Mood is not anxious or depressed. Affect is not labile, blunt, angry or inappropriate.         Speech: He is communicative. Speech is not rapid and pressured, delayed, slurred or tangential.         Behavior: Behavior normal. Behavior is not agitated, slowed, aggressive, withdrawn, hyperactive or combative.         Thought Content: Thought content normal. Thought content is not paranoid or delusional. Thought content does not include homicidal or suicidal ideation. Thought content does not include homicidal or suicidal plan.         Cognition and Memory: Memory is not impaired.         Judgment: Judgment normal. Judgment is not impulsive or inappropriate.       Assessment:     1. Hypertension, essential    2. Well adult exam     3. Encounter for long-term (current) use of medications    4. Encounter for lipid screening for cardiovascular disease    5. Encounter for screening for other viral diseases    6. Vitamin D deficiency, unspecified     7. Deficiency of other specified B group vitamins       I have signed for the following orders AND/OR meds.  Orders Placed This Encounter   Procedures    Lipid Panel     Standing Status:   Future     Number of Occurrences:   1     Standing Expiration Date:   2/27/2022    Hemoglobin A1C     Standing Status:   Future     Number of Occurrences:   1     Standing Expiration Date:   2/27/2022    CBC Without Differential     Standing Status:   Future     Number of Occurrences:   1     Standing Expiration Date:   2/27/2022    Comprehensive Metabolic Panel     Standing Status:   Future     Number of Occurrences:   1     Standing Expiration Date:   2/27/2022    TSH     Standing Status:   Future     Number of Occurrences:   1     Standing Expiration Date:   2/27/2022    COVID-19 (SARS CoV-2) IgG Antibody     Standing Status:   Future     Number of Occurrences:   1     Standing Expiration Date:   2/27/2022     Order Specific Question:   Diagnosis:     Answer:   Encounter for antibody response examination [[id number]]    Vitamin D     Standing Status:   Future     Number of Occurrences:   1     Standing Expiration Date:   2/27/2022    Vitamin B12     Standing Status:   Future     Number of Occurrences:   1     Standing Expiration Date:   2/27/2022         Medication List with Changes/Refills   Current Medications    ASCORBIC ACID, VITAMIN C, (VITAMIN C) 500 MG TABLET    Take 500 mg by mouth once daily.    ASPIRIN (ECOTRIN) 81 MG EC TABLET    Take 1 tablet (81 mg total) by mouth once daily.    ATORVASTATIN (LIPITOR) 20 MG TABLET    Take 1 tablet (20 mg total) by mouth once daily.    FISH OIL-OMEGA-3 FATTY ACIDS 300-1,000 MG CAPSULE    Take by mouth once daily.    FLUZONE HIGHDOSE QUAD 20-21  MCG/0.7 ML  SYRG        FOLIC ACID (FOLVITE) 1 MG TABLET    Take 1 mg by mouth once daily.    LISINOPRIL (PRINIVIL,ZESTRIL) 20 MG TABLET    Take 1 tablet (20 mg total) by mouth once daily.    SAW PALMETTO FRUIT 450 MG CAP    Take 4 capsules by mouth.    ZINC GLUCONATE 50 MG TABLET    Take 50 mg by mouth once daily.     Follow up if symptoms worsen or fail to improve, for As scheduled.  Future Appointments     Date Provider Specialty Appt Notes    2/23/2021 Elias Rowan MD Family Medicine Med Refill    9/24/2021 Elias Rowan MD Family Medicine ANNUAL          If no improvement in symptoms or symptoms worsen, advised to call/follow-up at clinic or go to ER. Patient voiced understanding and all questions/concerns were addressed.   DISCLAIMER: This note was compiled by using a speech recognition dictation system and therefore please be aware that typographical / speech recognition errors can and do occur.  Please contact me if you see any errors specifically.  Elias Rowan M.D.

## 2020-12-29 NOTE — PATIENT INSTRUCTIONS
Follow up if symptoms worsen or fail to improve, for As scheduled.     If no improvement in symptoms or symptoms worsen, please be advised to call MD, follow-up at clinic and/or go to ER if becomes severe.    Elias Rowan M.D.        We Offer TELEHEALTH & Same Day Appointments!   Book your Telehealth appointment with me through my nurse or   Clinic appointments on [employer]!    [address] [place] [address]    Office: [phone]   FAX: [phone]    Check out my Facebook Page and Follow Me at: [email or web address]    Check out my website at [employer] by clicking on: [email or web address]    To Schedule appointments online, go to [employer]: [email or web address]

## 2020-12-29 NOTE — ASSESSMENT & PLAN NOTE
Continue lisinopril 20 mg daily.Counseled on importance of hypertension disease course, I recommend ongoing Education for DASH-diet and exercise.  Counseled on medication regimen importance of treating high blood pressure.  Please be advised of risk of untreated blood pressure as discussed.  Please advised of ER precautions were given for symptoms of hypertensive urgency and emergency.

## 2021-01-01 LAB
25(OH)D3 SERPL-MCNC: 28 NG/ML (ref 30–100)
ALBUMIN SERPL-MCNC: 4.4 G/DL (ref 3.6–5.1)
ALBUMIN/GLOB SERPL: 1.6 (CALC) (ref 1–2.5)
ALP SERPL-CCNC: 87 U/L (ref 35–144)
ALT SERPL-CCNC: 36 U/L (ref 9–46)
AST SERPL-CCNC: 25 U/L (ref 10–35)
BILIRUB SERPL-MCNC: 0.6 MG/DL (ref 0.2–1.2)
BUN SERPL-MCNC: 21 MG/DL (ref 7–25)
BUN/CREAT SERPL: NORMAL (CALC) (ref 6–22)
CALCIUM SERPL-MCNC: 9.4 MG/DL (ref 8.6–10.3)
CHLORIDE SERPL-SCNC: 103 MMOL/L (ref 98–110)
CHOLEST SERPL-MCNC: 135 MG/DL
CHOLEST/HDLC SERPL: 2.4 (CALC)
CO2 SERPL-SCNC: 30 MMOL/L (ref 20–32)
CREAT SERPL-MCNC: 0.9 MG/DL (ref 0.7–1.18)
ERYTHROCYTE [DISTWIDTH] IN BLOOD BY AUTOMATED COUNT: 12.8 % (ref 11–15)
GFRSERPLBLD MDRD-ARVRAT: 84 ML/MIN/1.73M2
GLOBULIN SER CALC-MCNC: 2.8 G/DL (CALC) (ref 1.9–3.7)
GLUCOSE SERPL-MCNC: 98 MG/DL (ref 65–99)
HBA1C MFR BLD: 5.5 % OF TOTAL HGB
HCT VFR BLD AUTO: 43.3 % (ref 38.5–50)
HDLC SERPL-MCNC: 56 MG/DL
HGB BLD-MCNC: 14.7 G/DL (ref 13.2–17.1)
LDLC SERPL CALC-MCNC: 58 MG/DL (CALC)
MCH RBC QN AUTO: 32.8 PG (ref 27–33)
MCHC RBC AUTO-ENTMCNC: 33.9 G/DL (ref 32–36)
MCV RBC AUTO: 96.7 FL (ref 80–100)
NONHDLC SERPL-MCNC: 79 MG/DL (CALC)
PLATELET # BLD AUTO: 188 THOUSAND/UL (ref 140–400)
PMV BLD REES-ECKER: 10.1 FL (ref 7.5–12.5)
POTASSIUM SERPL-SCNC: 4.5 MMOL/L (ref 3.5–5.3)
PROT SERPL-MCNC: 7.2 G/DL (ref 6.1–8.1)
RBC # BLD AUTO: 4.48 MILLION/UL (ref 4.2–5.8)
SARS-COV-2 IGG SERPL QL IA: NEGATIVE
SODIUM SERPL-SCNC: 140 MMOL/L (ref 135–146)
TRIGL SERPL-MCNC: 126 MG/DL
TSH SERPL-ACNC: 1.12 MIU/L (ref 0.4–4.5)
VIT B12 SERPL-MCNC: 704 PG/ML (ref 200–1100)
WBC # BLD AUTO: 4.4 THOUSAND/UL (ref 3.8–10.8)

## 2021-01-02 NOTE — PROGRESS NOTES
#CALL THE PATIENT# to discuss results/see if they have questions and document verification. Make FU appt if needed.    #Pend me the orders in my interpretation below.#    I have sent a message to them with the interpretation (see below).  See if they have any questions and make a follow-up appointment if not already schedule and if needed.    Also please address any outstanding health maintenance that may be due: TETANUS VACCINE due on 08/26/1965  COVID-19 Vaccine(1) due on 08/26/1965  Shingles Vaccine(1 of 2) due on 08/26/1997  Pneumococcal Vaccine (65+ Low/Medium Risk)(1 of 2 - PCV13) due on 08/26/2012  ====================================    My interpretation that was sent to them through [email or web address]:    PATSY ARAGON, I have reviewed your recent blood work.     YOUR COVID ANTIBODY TEST IS NEGATIVE.  THEREFORE THERE HAS NOT BEEN AN IMMUNE RESPONSE TO COVID YET.  Your complete blood count is NORMAL.  NO ANEMIA.  Your metabolic panel which shows your glucose, kidney function, electrolytes, and liver function is NORMAL.   Thyroid studies are NORMAL.   Your cholesterol is STABLE.    Your vitamin-D is LOW .  START VITAMIN-D SUPPLEMENTATION 2000 UNITS DAILY.  RECHECK VITAMIN-D IN A FEW MONTHS.  YOUR VITAMIN B12 LEVEL IS NORMAL.  Your hemoglobin A1c is NORMAL.  This test is gold standard screening test for diabetes.  It is a measures 3 months of your average blood sugar.

## 2021-01-12 DIAGNOSIS — E55.9 VITAMIN D DEFICIENCY: Primary | ICD-10-CM

## 2021-01-12 RX ORDER — ERGOCALCIFEROL 1.25 MG/1
50000 CAPSULE ORAL
Qty: 12 CAPSULE | Refills: 4 | Status: SHIPPED | OUTPATIENT
Start: 2021-01-12 | End: 2021-10-22 | Stop reason: SDUPTHER

## 2021-07-08 DIAGNOSIS — A49.9 BACTERIAL INFECTION: Primary | ICD-10-CM

## 2021-07-08 RX ORDER — AZITHROMYCIN 250 MG/1
TABLET, FILM COATED ORAL
Qty: 6 TABLET | Refills: 0 | Status: SHIPPED | OUTPATIENT
Start: 2021-07-08 | End: 2021-07-13

## 2021-10-22 ENCOUNTER — OFFICE VISIT (OUTPATIENT)
Dept: FAMILY MEDICINE | Facility: CLINIC | Age: 76
End: 2021-10-22
Payer: MEDICARE

## 2021-10-22 VITALS — SYSTOLIC BLOOD PRESSURE: 130 MMHG | DIASTOLIC BLOOD PRESSURE: 62 MMHG

## 2021-10-22 DIAGNOSIS — Z12.5 ENCOUNTER FOR PROSTATE CANCER SCREENING: ICD-10-CM

## 2021-10-22 DIAGNOSIS — Z00.00 WELL ADULT EXAM: Primary | ICD-10-CM

## 2021-10-22 DIAGNOSIS — Z13.6 ENCOUNTER FOR LIPID SCREENING FOR CARDIOVASCULAR DISEASE: ICD-10-CM

## 2021-10-22 DIAGNOSIS — E78.5 HYPERLIPIDEMIA, UNSPECIFIED HYPERLIPIDEMIA TYPE: ICD-10-CM

## 2021-10-22 DIAGNOSIS — Z13.220 ENCOUNTER FOR LIPID SCREENING FOR CARDIOVASCULAR DISEASE: ICD-10-CM

## 2021-10-22 DIAGNOSIS — E55.9 VITAMIN D DEFICIENCY: ICD-10-CM

## 2021-10-22 DIAGNOSIS — Z79.899 ENCOUNTER FOR LONG-TERM (CURRENT) USE OF MEDICATIONS: ICD-10-CM

## 2021-10-22 PROCEDURE — 1159F MED LIST DOCD IN RCRD: CPT | Mod: CPTII,95,, | Performed by: FAMILY MEDICINE

## 2021-10-22 PROCEDURE — 1159F PR MEDICATION LIST DOCUMENTED IN MEDICAL RECORD: ICD-10-PCS | Mod: CPTII,95,, | Performed by: FAMILY MEDICINE

## 2021-10-22 PROCEDURE — 1160F PR REVIEW ALL MEDS BY PRESCRIBER/CLIN PHARMACIST DOCUMENTED: ICD-10-PCS | Mod: CPTII,95,, | Performed by: FAMILY MEDICINE

## 2021-10-22 PROCEDURE — 99443 PR PHYSICIAN TELEPHONE EVALUATION 21-30 MIN: ICD-10-PCS | Mod: 95,,, | Performed by: FAMILY MEDICINE

## 2021-10-22 PROCEDURE — 99443 PR PHYSICIAN TELEPHONE EVALUATION 21-30 MIN: CPT | Mod: 95,,, | Performed by: FAMILY MEDICINE

## 2021-10-22 PROCEDURE — 4010F PR ACE/ARB THEARPY RXD/TAKEN: ICD-10-PCS | Mod: CPTII,95,, | Performed by: FAMILY MEDICINE

## 2021-10-22 PROCEDURE — 1160F RVW MEDS BY RX/DR IN RCRD: CPT | Mod: CPTII,95,, | Performed by: FAMILY MEDICINE

## 2021-10-22 PROCEDURE — 4010F ACE/ARB THERAPY RXD/TAKEN: CPT | Mod: CPTII,95,, | Performed by: FAMILY MEDICINE

## 2021-10-22 RX ORDER — ASPIRIN 81 MG/1
81 TABLET ORAL DAILY
Qty: 90 TABLET | Refills: 11
Start: 2021-10-22 | End: 2024-02-08 | Stop reason: SDUPTHER

## 2021-10-22 RX ORDER — ERGOCALCIFEROL 1.25 MG/1
50000 CAPSULE ORAL
Qty: 12 CAPSULE | Refills: 4 | Status: SHIPPED | OUTPATIENT
Start: 2021-10-22 | End: 2022-08-18

## 2021-10-22 RX ORDER — ATORVASTATIN CALCIUM 20 MG/1
20 TABLET, FILM COATED ORAL DAILY
Qty: 90 TABLET | Refills: 4 | Status: SHIPPED | OUTPATIENT
Start: 2021-10-22 | End: 2022-12-14 | Stop reason: SDUPTHER

## 2021-11-23 ENCOUNTER — TELEPHONE (OUTPATIENT)
Dept: ADMINISTRATIVE | Facility: HOSPITAL | Age: 76
End: 2021-11-23
Payer: MEDICARE

## 2021-11-24 ENCOUNTER — OFFICE VISIT (OUTPATIENT)
Dept: FAMILY MEDICINE | Facility: CLINIC | Age: 76
End: 2021-11-24
Payer: MEDICARE

## 2021-11-24 DIAGNOSIS — Z79.899 ENCOUNTER FOR LONG-TERM (CURRENT) USE OF MEDICATIONS: ICD-10-CM

## 2021-11-24 DIAGNOSIS — I10 HYPERTENSION, ESSENTIAL: ICD-10-CM

## 2021-11-24 DIAGNOSIS — E55.9 VITAMIN D DEFICIENCY: ICD-10-CM

## 2021-11-24 DIAGNOSIS — E78.5 HYPERLIPIDEMIA, UNSPECIFIED HYPERLIPIDEMIA TYPE: Primary | ICD-10-CM

## 2021-11-24 PROCEDURE — 4010F ACE/ARB THERAPY RXD/TAKEN: CPT | Mod: CPTII,95,, | Performed by: FAMILY MEDICINE

## 2021-11-24 PROCEDURE — 99213 PR OFFICE/OUTPT VISIT, EST, LEVL III, 20-29 MIN: ICD-10-PCS | Mod: 95,,, | Performed by: FAMILY MEDICINE

## 2021-11-24 PROCEDURE — 99213 OFFICE O/P EST LOW 20 MIN: CPT | Mod: 95,,, | Performed by: FAMILY MEDICINE

## 2021-11-24 PROCEDURE — 4010F PR ACE/ARB THEARPY RXD/TAKEN: ICD-10-PCS | Mod: CPTII,95,, | Performed by: FAMILY MEDICINE

## 2021-11-26 VITALS — SYSTOLIC BLOOD PRESSURE: 120 MMHG | DIASTOLIC BLOOD PRESSURE: 80 MMHG

## 2021-11-26 PROBLEM — E55.9 VITAMIN D DEFICIENCY: Chronic | Status: ACTIVE | Noted: 2021-10-22

## 2021-11-26 PROBLEM — Z79.899 ENCOUNTER FOR LONG-TERM (CURRENT) USE OF MEDICATIONS: Chronic | Status: ACTIVE | Noted: 2019-10-17

## 2021-11-26 PROBLEM — E78.5 HYPERLIPIDEMIA: Chronic | Status: ACTIVE | Noted: 2019-10-17

## 2021-11-26 PROBLEM — I10 HYPERTENSION, ESSENTIAL: Chronic | Status: ACTIVE | Noted: 2019-10-17

## 2022-01-21 ENCOUNTER — PES CALL (OUTPATIENT)
Dept: ADMINISTRATIVE | Facility: CLINIC | Age: 77
End: 2022-01-21
Payer: MEDICARE

## 2022-01-30 DIAGNOSIS — A49.9 BACTERIAL INFECTION: Primary | ICD-10-CM

## 2022-01-30 RX ORDER — SULFAMETHOXAZOLE AND TRIMETHOPRIM 800; 160 MG/1; MG/1
1 TABLET ORAL 2 TIMES DAILY
Qty: 28 TABLET | Refills: 0 | Status: SHIPPED | OUTPATIENT
Start: 2022-01-30 | End: 2022-02-13

## 2022-05-05 ENCOUNTER — TELEPHONE (OUTPATIENT)
Dept: ADMINISTRATIVE | Facility: HOSPITAL | Age: 77
End: 2022-05-05
Payer: MEDICARE

## 2022-05-26 RX ORDER — VALACYCLOVIR HYDROCHLORIDE 1 G/1
1000 TABLET, FILM COATED ORAL 2 TIMES DAILY
Qty: 60 TABLET | Refills: 0 | Status: SHIPPED | OUTPATIENT
Start: 2022-05-26 | End: 2023-12-10 | Stop reason: SDUPTHER

## 2022-05-31 ENCOUNTER — PATIENT MESSAGE (OUTPATIENT)
Dept: FAMILY MEDICINE | Facility: CLINIC | Age: 77
End: 2022-05-31
Payer: MEDICARE

## 2022-07-21 ENCOUNTER — PES CALL (OUTPATIENT)
Dept: ADMINISTRATIVE | Facility: CLINIC | Age: 77
End: 2022-07-21
Payer: MEDICARE

## 2022-07-21 DIAGNOSIS — A49.9 BACTERIAL INFECTION: Primary | ICD-10-CM

## 2022-07-21 RX ORDER — DOXYCYCLINE 100 MG/1
100 CAPSULE ORAL EVERY 12 HOURS
Qty: 60 CAPSULE | Refills: 2 | Status: SHIPPED | OUTPATIENT
Start: 2022-07-21 | End: 2022-08-11

## 2022-08-11 ENCOUNTER — TELEPHONE (OUTPATIENT)
Dept: FAMILY MEDICINE | Facility: CLINIC | Age: 77
End: 2022-08-11
Payer: MEDICARE

## 2022-08-11 ENCOUNTER — OFFICE VISIT (OUTPATIENT)
Dept: FAMILY MEDICINE | Facility: CLINIC | Age: 77
End: 2022-08-11
Payer: MEDICARE

## 2022-08-11 VITALS
HEIGHT: 72 IN | OXYGEN SATURATION: 98 % | TEMPERATURE: 98 F | DIASTOLIC BLOOD PRESSURE: 82 MMHG | WEIGHT: 176.38 LBS | SYSTOLIC BLOOD PRESSURE: 120 MMHG | HEART RATE: 83 BPM | BODY MASS INDEX: 23.89 KG/M2

## 2022-08-11 DIAGNOSIS — N13.30 HYDRONEPHROSIS, RIGHT: ICD-10-CM

## 2022-08-11 DIAGNOSIS — K82.8 GALLBLADDER SLUDGE: ICD-10-CM

## 2022-08-11 DIAGNOSIS — R30.0 DYSURIA: Primary | ICD-10-CM

## 2022-08-11 DIAGNOSIS — Z00.00 WELLNESS EXAMINATION: Primary | ICD-10-CM

## 2022-08-11 DIAGNOSIS — N40.0 PROSTATE HYPERTROPHY: ICD-10-CM

## 2022-08-11 DIAGNOSIS — N39.0 URINARY TRACT INFECTION WITHOUT HEMATURIA, SITE UNSPECIFIED: ICD-10-CM

## 2022-08-11 DIAGNOSIS — R10.9 FLANK PAIN: ICD-10-CM

## 2022-08-11 DIAGNOSIS — K59.00 CONSTIPATION, UNSPECIFIED CONSTIPATION TYPE: ICD-10-CM

## 2022-08-11 DIAGNOSIS — Z79.899 ENCOUNTER FOR LONG-TERM (CURRENT) USE OF MEDICATIONS: ICD-10-CM

## 2022-08-11 DIAGNOSIS — R10.9 ABDOMINAL PAIN, UNSPECIFIED ABDOMINAL LOCATION: ICD-10-CM

## 2022-08-11 DIAGNOSIS — N20.1 URETEROLITHIASIS: ICD-10-CM

## 2022-08-11 DIAGNOSIS — R35.0 URINARY FREQUENCY: ICD-10-CM

## 2022-08-11 DIAGNOSIS — R53.83 FATIGUE, UNSPECIFIED TYPE: ICD-10-CM

## 2022-08-11 DIAGNOSIS — Z12.5 ENCOUNTER FOR PROSTATE CANCER SCREENING: ICD-10-CM

## 2022-08-11 DIAGNOSIS — R91.1 PULMONARY NODULE: ICD-10-CM

## 2022-08-11 LAB
BACTERIA #/AREA URNS HPF: ABNORMAL /HPF
BILIRUB UR QL STRIP: NEGATIVE
CLARITY UR: CLEAR
COLOR UR: YELLOW
CTP QC/QA: YES
GLUCOSE UR QL STRIP: NEGATIVE
HGB UR QL STRIP: ABNORMAL
KETONES UR QL STRIP: NEGATIVE
LEUKOCYTE ESTERASE UR QL STRIP: NEGATIVE
MICROSCOPIC COMMENT: ABNORMAL
NITRITE UR QL STRIP: NEGATIVE
PH UR STRIP: 6 [PH] (ref 5–8)
PROT UR QL STRIP: NEGATIVE
RBC #/AREA URNS HPF: 62 /HPF (ref 0–4)
SARS-COV-2 RDRP RESP QL NAA+PROBE: NEGATIVE
SP GR UR STRIP: 1.01 (ref 1–1.03)
SQUAMOUS #/AREA URNS HPF: ABNORMAL /HPF
URN SPEC COLLECT METH UR: ABNORMAL
WBC #/AREA URNS HPF: 3 /HPF (ref 0–5)

## 2022-08-11 PROCEDURE — 1101F PR PT FALLS ASSESS DOC 0-1 FALLS W/OUT INJ PAST YR: ICD-10-PCS | Mod: CPTII,S$GLB,, | Performed by: FAMILY MEDICINE

## 2022-08-11 PROCEDURE — 99397 PR PREVENTIVE VISIT,EST,65 & OVER: ICD-10-PCS | Mod: GZ,S$GLB,, | Performed by: FAMILY MEDICINE

## 2022-08-11 PROCEDURE — 4010F PR ACE/ARB THEARPY RXD/TAKEN: ICD-10-PCS | Mod: CPTII,S$GLB,, | Performed by: FAMILY MEDICINE

## 2022-08-11 PROCEDURE — 99999 PR PBB SHADOW E&M-EST. PATIENT-LVL IV: ICD-10-PCS | Mod: PBBFAC,,, | Performed by: FAMILY MEDICINE

## 2022-08-11 PROCEDURE — 3288F PR FALLS RISK ASSESSMENT DOCUMENTED: ICD-10-PCS | Mod: CPTII,S$GLB,, | Performed by: FAMILY MEDICINE

## 2022-08-11 PROCEDURE — 1126F AMNT PAIN NOTED NONE PRSNT: CPT | Mod: CPTII,S$GLB,, | Performed by: FAMILY MEDICINE

## 2022-08-11 PROCEDURE — 3074F SYST BP LT 130 MM HG: CPT | Mod: CPTII,S$GLB,, | Performed by: FAMILY MEDICINE

## 2022-08-11 PROCEDURE — 3079F PR MOST RECENT DIASTOLIC BLOOD PRESSURE 80-89 MM HG: ICD-10-PCS | Mod: CPTII,S$GLB,, | Performed by: FAMILY MEDICINE

## 2022-08-11 PROCEDURE — 3008F PR BODY MASS INDEX (BMI) DOCUMENTED: ICD-10-PCS | Mod: CPTII,S$GLB,, | Performed by: FAMILY MEDICINE

## 2022-08-11 PROCEDURE — 1159F MED LIST DOCD IN RCRD: CPT | Mod: CPTII,S$GLB,, | Performed by: FAMILY MEDICINE

## 2022-08-11 PROCEDURE — 1126F PR PAIN SEVERITY QUANTIFIED, NO PAIN PRESENT: ICD-10-PCS | Mod: CPTII,S$GLB,, | Performed by: FAMILY MEDICINE

## 2022-08-11 PROCEDURE — 3288F FALL RISK ASSESSMENT DOCD: CPT | Mod: CPTII,S$GLB,, | Performed by: FAMILY MEDICINE

## 2022-08-11 PROCEDURE — 1160F RVW MEDS BY RX/DR IN RCRD: CPT | Mod: CPTII,S$GLB,, | Performed by: FAMILY MEDICINE

## 2022-08-11 PROCEDURE — U0002: ICD-10-PCS | Mod: QW,S$GLB,, | Performed by: FAMILY MEDICINE

## 2022-08-11 PROCEDURE — 3008F BODY MASS INDEX DOCD: CPT | Mod: CPTII,S$GLB,, | Performed by: FAMILY MEDICINE

## 2022-08-11 PROCEDURE — 3074F PR MOST RECENT SYSTOLIC BLOOD PRESSURE < 130 MM HG: ICD-10-PCS | Mod: CPTII,S$GLB,, | Performed by: FAMILY MEDICINE

## 2022-08-11 PROCEDURE — 4010F ACE/ARB THERAPY RXD/TAKEN: CPT | Mod: CPTII,S$GLB,, | Performed by: FAMILY MEDICINE

## 2022-08-11 PROCEDURE — 99999 PR PBB SHADOW E&M-EST. PATIENT-LVL IV: CPT | Mod: PBBFAC,,, | Performed by: FAMILY MEDICINE

## 2022-08-11 PROCEDURE — 81000 URINALYSIS NONAUTO W/SCOPE: CPT | Mod: PO | Performed by: FAMILY MEDICINE

## 2022-08-11 PROCEDURE — 1159F PR MEDICATION LIST DOCUMENTED IN MEDICAL RECORD: ICD-10-PCS | Mod: CPTII,S$GLB,, | Performed by: FAMILY MEDICINE

## 2022-08-11 PROCEDURE — 3079F DIAST BP 80-89 MM HG: CPT | Mod: CPTII,S$GLB,, | Performed by: FAMILY MEDICINE

## 2022-08-11 PROCEDURE — 99397 PER PM REEVAL EST PAT 65+ YR: CPT | Mod: GZ,S$GLB,, | Performed by: FAMILY MEDICINE

## 2022-08-11 PROCEDURE — 1160F PR REVIEW ALL MEDS BY PRESCRIBER/CLIN PHARMACIST DOCUMENTED: ICD-10-PCS | Mod: CPTII,S$GLB,, | Performed by: FAMILY MEDICINE

## 2022-08-11 PROCEDURE — U0002 COVID-19 LAB TEST NON-CDC: HCPCS | Mod: QW,S$GLB,, | Performed by: FAMILY MEDICINE

## 2022-08-11 PROCEDURE — 1101F PT FALLS ASSESS-DOCD LE1/YR: CPT | Mod: CPTII,S$GLB,, | Performed by: FAMILY MEDICINE

## 2022-08-11 RX ORDER — PHENAZOPYRIDINE HYDROCHLORIDE 200 MG/1
200 TABLET, FILM COATED ORAL 3 TIMES DAILY PRN
Qty: 9 TABLET | Refills: 0 | Status: SHIPPED | OUTPATIENT
Start: 2022-08-11 | End: 2022-08-14

## 2022-08-11 RX ORDER — CEPHALEXIN 500 MG/1
500 CAPSULE ORAL 4 TIMES DAILY
Qty: 28 CAPSULE | Refills: 0 | Status: SHIPPED | OUTPATIENT
Start: 2022-08-11 | End: 2022-08-18

## 2022-08-11 RX ORDER — KETOROLAC TROMETHAMINE 10 MG/1
10 TABLET, FILM COATED ORAL EVERY 6 HOURS
Qty: 20 TABLET | Refills: 0 | Status: SHIPPED | OUTPATIENT
Start: 2022-08-11 | End: 2022-08-18

## 2022-08-11 RX ORDER — TAMSULOSIN HYDROCHLORIDE 0.4 MG/1
0.4 CAPSULE ORAL 2 TIMES DAILY
Qty: 14 CAPSULE | Refills: 0 | Status: SHIPPED | OUTPATIENT
Start: 2022-08-11 | End: 2022-08-18 | Stop reason: SDUPTHER

## 2022-08-11 NOTE — Clinical Note
Please check on scheduling the CT scan stat today.  Let me know the location in the time prior to contacting the patient.  Also I have ordered a urine culture.  Make sure that the lab has this performed.

## 2022-08-11 NOTE — TELEPHONE ENCOUNTER
I have signed for the following orders AND/OR meds.  Please call the patient and ask the patient to schedule the testing AND/OR inform about any medications that were sent.      Orders Placed This Encounter   Procedures    Urinalysis, Reflex to Urine Culture Urine, Clean Catch     Standing Status:   Future     Standing Expiration Date:   2/11/2024     Order Specific Question:   Preferred Collection Type     Answer:   Urine, Clean Catch     Order Specific Question:   Specimen Source     Answer:   Urine

## 2022-08-11 NOTE — PATIENT INSTRUCTIONS
Follow up in about 1 year (around 8/11/2023), or if symptoms worsen or fail to improve, for Annual Wellness Exam.     Dear patient,   As a result of recent federal legislation (The Federal Cures Act), you may receive lab or pathology results from your visit in your [facility] account before your physician is able to contact you. Your physician or their representative will relay the results to you with their recommendations at their soonest availability.     If no improvement in symptoms or symptoms worsen, please be advised to call MD, follow-up at clinic and/or go to ER if becomes severe.    Elias Rowan M.D.        We Offer TELEHEALTH & Same Day Appointments!   Book your Telehealth appointment with me through my nurse or   Clinic appointments on [employer]!    [address] [place] [address]    Office: [phone]   FAX: [phone]    Check out my Facebook Page and Follow Me at: [email or web address]    Check out my website at [employer] by clicking on: [email or web address]    To Schedule appointments online, go to [employer]: [email or web address]

## 2022-08-11 NOTE — PROGRESS NOTES
This note is specifically for wellness visit performed today.     WELLNESS EXAM      Patient ID: Trell Rios is a 76 y.o. male.  has a past medical history of Encounter for long-term (current) use of medications (10/17/2019), Hyperlipidemia (10/17/2019), and Hypertension.     Chief Complaint:  Encounter for wellness exam    Well Adult Physical: Patient here for a comprehensive physical exam.The patient reports chronic problems.  The patient's last visit with me was on 11/24/2021.    Reviewed care team:  Previous PCP Dr. Austin  Urology Dr. Dannie Nair    August 2022: Patient reports that he woke up last night with severe right-sided lower back/flank pain.  Difficulty urinating.  Patient with blood in his urine today.  CT scan was performed which shows abnormalities of ureteral stone.     Other findings were discussed with the patient.  CT Renal Stone Study ABD Pelvis WO  EXAMINATION:  CT RENAL STONE STUDY ABD PELVIS WO 08/11/2022 at 12:46    INDICATION:  Clinical history is abdominal, flank pain.  History of previous inguinal hernia repair.  No history of recent trauma or surgery is provided.    COMPARISON:  No recent plain film is currently available.  No recent CT or ultrasound is currently available.    TECHNIQUE:  Axial non contrast-enhanced images are obtained of the abdomen and pelvis with coronal and sagittal reconstruction views using dose automated exposure control. Automatic dose reduction is utilized. DLP is 455 mGy cm.    FINDINGS  No lung base pneumothorax or lobar type consolidation is appreciated. No layering free air or free fluid collections are appreciated. There are subcentimeter para-aortic, mesenteric lymph nodes present.  Small hiatal hernia with slight gastroesophageal fold thickening is appreciated.  There is some ground-glass nodularity present anterior basal, right lung base measuring 3 x 5 mm image 10.  There are calcifications present suggestive of phleboliths.  The bladder is  incompletely fluid-filled for evaluation which may exaggerate the wall thickness.  This can also be seen with chronic postinflammatory sequela.  Some aspects of the colon are under distended for evaluation although may be peristaltic related.  There is prostatic hypertrophy indenting the adjacent bladder margin.  There is an unremarkable appearance of the appendix demonstrated.  There is some trace gallbladder sludge present.  There is some punctate medullary nephrocalcinosis type appearance overall bilaterally.  There is  some fluid dense nephrogenic cystic appearance present on the left.  There is asymmetric right perinephric stranding and hydronephrosis, hydroureter corresponding with a distal ureteral UVJ calculus measuring 2 x 3 x 2 mm.  There is otherwise unremarkable overall appearance of the liver, gallbladder, adrenal glands, kidneys, spleen and pancreas.  No displaced fracture or dislocation is appreciated. There is abundant stool throughout the colon suggestive of constipation.  This limits mucosal detail evaluation.  There is slight decreased bone mineralization, degenerative change and atherosclerotic vascular calcifications appreciated.  This includes multilevel advanced degeneration overall throughout the thoracolumbar spine contributing to some osseous neural foraminal narrowing.  No previous comparison study is currently available to evaluate for stability or interval change.    IMPRESSION  1. There is unremarkable appearance of the appendix.  2.  There is abundant stool throughout the colon suggestive of constipation..  3. There is trace gallbladder sludge.  4. There is asymmetric right perinephric stranding demonstrated with hydronephrosis, hydroureter and a distal ureteral UVJ calculus measuring 2 x 3 x 2 mm.  5. There is some prostatic hypertrophy indenting the adjacent bladder margin.  6.  The bladder is incompletely fluid-filled for evaluation which may exaggerate the wall thickness.  This can  also be seen with chronic postinflammatory sequela.  7. There is a 3 x 5 mm ground-glass nodule present at the right lung base.  Comparison to any previous older CT chest study could be performed if clinically available to evaluate for stability or interval change versus follow-up as per Fleischner society guidelines.    Electronically signed by: Yasir Palma MD  Date:    08/11/2022  Time:    12:56          Chronic. Vit d deficiency. Takes vitamin d supplement. No SE reported. Fatigue is slightly improved.   No results found for: [id number]     CHRONIC. STABLE. Lab analysis reviewed.   (-) CP, SOB, abdominal pain, N/V/D, constipation, jaundice, skin changes.  (-) Myalgias  Lab Results   Component Value Date    CHOL 135 12/31/2020    CHOL 118 10/17/2019     Lab Results   Component Value Date    HDL 56 12/31/2020    HDL 54 10/17/2019     Lab Results   Component Value Date    LDLCALC 58 12/31/2020    LDLCALC 41 10/17/2019     Lab Results   Component Value Date    TRIG 126 12/31/2020    TRIG 145 10/17/2019     Lab Results   Component Value Date    CHOLHDL 2.4 12/31/2020    CHOLHDL 2.2 10/17/2019     No results found for: TOTALCHOLEST  Lab Results   Component Value Date    ALT 36 12/31/2020    AST 25 12/31/2020    ALKPHOS 99 10/17/2019    BILITOT 0.6 12/31/2020     ======================================================  CHRONIC. STABLE. BP Reviewed.  Compliant with BP medications. No SE reported.   (-) CP, SOB, palpitations, dizziness, lightheadedness, HA, arm numbness, tingling or weakness, syncope.  Creatinine   Date Value Ref Range Status   12/31/2020 0.90 0.70 - 1.18 mg/dL Final     Comment:     For patients >49 years of age, the reference limit  for Creatinine is approximately 13% higher for people  identified as -American.            Do you take any herbs or supplements that were not prescribed by a doctor?  Yes saw palmetto, zinc, folic acid, fish oil  Are you taking calcium supplements? no   Lab Results    Component Value Date    WBC 4.4 12/31/2020    HGB 14.7 12/31/2020    HCT 43.3 12/31/2020    MCV 96.7 12/31/2020     12/31/2020       No results found for: IRON, TIBC, FERRITIN, SATURATEDIRO  Lab Results   Component Value Date    QADYGPEQ55 704 12/31/2020     No results found for: FOLATE    Chronic. Vit d deficiency. Takes vitamin d supplement. No SE reported. Fatigue is slightly improved.   No results found for: [id number]      History:  New onset ureterolithiasis August 2022  UROLOGIST:  None current  Date last PSA:  None available  The natural history of prostate cancer and ongoing controversy regarding screening and potential treatment outcomes of prostate cancer has been discussed with the patient. The meaning of a false positive PSA and a false negative PSA has been discussed. He indicates understanding of the limitations of this screening test and wishes  to proceed with screening PSA testing.    No results found for: PSA The natural history of prostate cancer and ongoing controversy regarding screening and potential treatment outcomes of prostate cancer has been discussed with the patient. The meaning of a false positive PSA and a false negative PSA has been discussed. He indicates understanding of the limitations of this screening test and wishes  to proceed with screening PSA testing.    Colon cancer screening:  Up-to-date on Cologuard    Health Maintenance Topics with due status: Not Due       Topic Last Completion Date    TETANUS VACCINE 07/21/2016    Colorectal Cancer Screening 01/20/2020    Influenza Vaccine 10/06/2020    Lipid Panel 12/31/2020    Pneumococcal Vaccines (Age 65+) 10/22/2021    negative Cologuard.  Repeat in three years.    ==============================================  History reviewed.     Health Maintenance Due   Topic Date Due    Shingles Vaccine (1 of 2) Never done    COVID-19 Vaccine (4 - Booster for Pfizer series) 02/01/2022     Patient gets vaccines from his  work.  Past Medical History:  Past Medical History:   Diagnosis Date    Encounter for long-term (current) use of medications 10/17/2019     Patient is on CHRONIC long-term drug therapy for managed conditions. See medication list. Reports compliance.  No side effects reported.  Routine lab work is being monitored.  Patient does need refills today.   Lab Results Component Value Date  WBC 4.8 10/17/2019  HGB 15.7 10/17/2019  HCT 45.3 10/17/2019  MCV 96.6 10/17/2019   10/17/2019     Chemistry    Component Value Date/Time   1    Hyperlipidemia 10/17/2019     This condition is chronic.  Currently stable.  Patient reports compliance with hyperlipidemia treatment as prescribed.  No side effects reported at this time.  Lab Results Component Value Date  CHOL 118 10/17/2019  Lab Results Component Value Date  HDL 54 10/17/2019  Lab Results Component Value Date  LDLCALC 41 10/17/2019  Lab Results Component Value Date  TRIG 145 10/17/2019  Lab Results Compone    Hypertension      Past Surgical History:   Procedure Laterality Date    HERNIA REPAIR       Review of patient's allergies indicates:   Allergen Reactions    Sulfa (sulfonamide antibiotics) Rash     Current Outpatient Medications on File Prior to Visit   Medication Sig Dispense Refill    ascorbic acid, vitamin C, (VITAMIN C) 500 MG tablet Take 500 mg by mouth once daily.      aspirin (ECOTRIN) 81 MG EC tablet Take 1 tablet (81 mg total) by mouth once daily. 90 tablet 11    atorvastatin (LIPITOR) 20 MG tablet Take 1 tablet (20 mg total) by mouth once daily. 90 tablet 4    ergocalciferol (VITAMIN D2) 50,000 unit Cap Take 1 capsule (50,000 Units total) by mouth every 7 days. 12 capsule 4    fish oil-omega-3 fatty acids 300-1,000 mg capsule Take by mouth once daily.      folic acid (FOLVITE) 1 MG tablet Take 1 mg by mouth once daily.      lisinopriL (PRINIVIL,ZESTRIL) 20 MG tablet TAKE 1 TABLET DAILY FOR BLOOD PRESSURE *THANK YOU* 90 tablet 4    saw  palmetto fruit 450 mg Cap Take 4 capsules by mouth.      zinc gluconate 50 mg tablet Take 50 mg by mouth once daily.      valACYclovir (VALTREX) 1000 MG tablet Take 1 tablet (1,000 mg total) by mouth 2 (two) times daily. 60 tablet 0    [DISCONTINUED] amoxicillin (AMOXIL) 875 MG tablet TAKE 1 TABLET TWICE DAILY *THANK YOU* 30 tablet 1    [DISCONTINUED] doxycycline (VIBRAMYCIN) 100 MG Cap Take 1 capsule (100 mg total) by mouth every 12 (twelve) hours. 60 capsule 2     No current facility-administered medications on file prior to visit.     Social History     Socioeconomic History    Marital status:    Tobacco Use    Smoking status: Never Smoker    Smokeless tobacco: Never Used   Substance and Sexual Activity    Alcohol use: Never    Drug use: Never    Sexual activity: Yes     Partners: Female     Birth control/protection: Post-menopausal     History reviewed. No pertinent family history.    Vitals:    08/11/22 1038   BP: 120/82   Pulse: 83   Temp: 97.6 °F (36.4 °C)      Body mass index is 23.92 kg/m².     Review of Systems   Constitutional: Negative for activity change and unexpected weight change.   HENT: Negative for hearing loss, rhinorrhea and trouble swallowing.    Eyes: Negative for discharge and visual disturbance.   Respiratory: Negative for chest tightness and wheezing.    Cardiovascular: Negative for chest pain and palpitations.   Gastrointestinal: Positive for abdominal pain. Negative for blood in stool, constipation, diarrhea and vomiting.   Endocrine: Negative for polydipsia and polyuria.   Genitourinary: Positive for flank pain and frequency. Negative for difficulty urinating, hematuria and urgency.   Musculoskeletal: Negative for arthralgias, joint swelling and neck pain.   Neurological: Negative for weakness and headaches.   Psychiatric/Behavioral: Negative for confusion and dysphoric mood.          Objective:      Physical Exam  Vitals and nursing note reviewed.   Constitutional:        General: He is not in acute distress.     Appearance: He is well-developed. He is not diaphoretic.   HENT:      Head: Normocephalic and atraumatic.      Right Ear: External ear normal.      Left Ear: External ear normal.      Nose: Nose normal. No rhinorrhea.   Eyes:      Extraocular Movements: Extraocular movements intact.      Pupils: Pupils are equal, round, and reactive to light.   Cardiovascular:      Rate and Rhythm: Normal rate.      Pulses: Normal pulses.   Pulmonary:      Effort: Pulmonary effort is normal. No respiratory distress.      Breath sounds: Normal breath sounds.   Abdominal:      General: Bowel sounds are normal. There is no distension.      Palpations: Abdomen is soft.      Tenderness: There is no abdominal tenderness. There is no right CVA tenderness, left CVA tenderness, guarding or rebound.      Hernia: No hernia is present.      Comments: Diastasis recti noted   Musculoskeletal:         General: Normal range of motion.      Cervical back: Normal range of motion and neck supple.   Skin:     General: Skin is warm and dry.      Capillary Refill: Capillary refill takes less than 2 seconds.      Findings: No rash.   Neurological:      General: No focal deficit present.      Mental Status: He is alert and oriented to person, place, and time. Mental status is at baseline.      Cranial Nerves: No cranial nerve deficit.      Motor: No weakness.      Gait: Gait normal.   Psychiatric:         Attention and Perception: He is attentive.         Mood and Affect: Mood normal. Mood is not anxious or depressed. Affect is not labile, blunt, angry or inappropriate.         Speech: He is communicative. Speech is not rapid and pressured, delayed, slurred or tangential.         Behavior: Behavior normal. Behavior is not agitated, slowed, aggressive, withdrawn, hyperactive or combative.         Thought Content: Thought content normal. Thought content is not paranoid or delusional. Thought content does not include  homicidal or suicidal ideation. Thought content does not include homicidal or suicidal plan.         Cognition and Memory: Memory is not impaired.         Judgment: Judgment normal. Judgment is not impulsive or inappropriate.          Assessment / Plan:      1.  Routine health exam-patient here for annual wellness visit.  Labs ordered.  Health maintenance was reviewed and ordered.    Hypertension:  Continue current medication regimen.  Counseled on importance of hypertension disease course, I recommend ongoing Education for DASH-diet and exercise.  Counseled on medication regimen importance of treating high blood pressure.  Please be advised of risk of untreated blood pressure as discussed.  Please advised of ER precautions were given for symptoms of hypertensive urgency and emergency.  Hyperlipidemia:  Continue statin.  Counseled on hyperlipidemia disease course, healthy diet and increased need for exercise.  Please be advised of the risk of cardiovascular disease, increase stroke and heart attack risk with uncontrolled/untreated hyperlipidemia.     Patient voiced understanding and understood the treatment plan. All questions were answered.   Complete history and limited telemedicine physical was completed today.  Complete and thorough medication reconciliation was performed.  Discussed risks and benefits of medications.  Advised patient on orders and health maintenance.  We discussed old records and old labs if available.  Will request any records not available through epic.  Continue current medications listed on your summary sheet.    Ureterolithiasis with hydronephrosis of the right:  Seen on CT scan start Flomax, Keflex.  Toradol for mild-to-moderate pain.  Patient has Norco at home for severe pain.  Increase hydration with water.Discussed condition course and signs and symptoms to expect.  Patient advised take anti-inflammatories and or Tylenol for pain or fever.  ER precautions.  Call MD or follow-up to  clinic if not improving or worsening symptoms.  Referral to Urology if no improvement.    Check labs.    Pulmonary nodule:  Incidental finding on recent imaging for kidney stone.  Plan to repeat CT scan or chest x-ray in one year.    Gallbladder sludge:  Caution for right upper quadrant pain.  Consider ultrasound if having any further issues with this.  General surgery consult if having gallbladder concern.    Constipation:Please be advised constipation condition course.  I recommend increase daily water intake to an acceptable level.  Increase fiber.  Recommend stool softener and laxative if needed.  Goal of 1 bowel movement daily.  Follow-up to clinic or notify me if no improvement or symptoms are persistent or worsening.  ER precautions were given.  Recommend daily exercise as tolerated, adequate water intake (six 8-oz glasses of water daily), and high fiber diet. OTC fiber supplements are recommended if diet does not reach daily fiber goal (20-30 grams daily), such as Metamucil, Citrucel, or FiberCon (take as directed, separate from other oral medications by >2 hours).  - CONTINUE OTC stool softener such as Colace as directed to avoid hard stools and straining with bowel movements PRN  -If still no improvement with these measures, call/follow-up      All questions were answered. Patient had no further concerns. Advised of diagnoses and plan. Follow up as planned or return sooner if symptoms persist or worsen.     Orders Placed This Encounter   Procedures    CULTURE, URINE     Standing Status:   Future     Number of Occurrences:   1     Standing Expiration Date:   10/10/2023    CT Renal Stone Study ABD Pelvis WO     Standing Status:   Future     Number of Occurrences:   1     Standing Expiration Date:   8/11/2023     Order Specific Question:   May the Radiologist modify the order per protocol to meet the clinical needs of the patient?     Answer:   Yes    Urinalysis, Reflex to Urine Culture Urine, Clean Catch      Standing Status:   Future     Number of Occurrences:   1     Standing Expiration Date:   2/11/2024     Order Specific Question:   Preferred Collection Type     Answer:   Urine, Clean Catch     Order Specific Question:   Specimen Source     Answer:   Urine    CBC Auto Differential     Standing Status:   Future     Number of Occurrences:   1     Standing Expiration Date:   10/10/2023    Comprehensive Metabolic Panel     Standing Status:   Future     Number of Occurrences:   1     Standing Expiration Date:   10/10/2023    Hemoglobin A1C     Standing Status:   Future     Number of Occurrences:   1     Standing Expiration Date:   10/10/2023    Urinalysis Microscopic     Specimen Source->Urine    PSA, Screening     Standing Status:   Future     Number of Occurrences:   1     Standing Expiration Date:   10/10/2023    POCT COVID-19 Rapid Screening     Standing Status:   Future     Number of Occurrences:   1     Standing Expiration Date:   10/10/2023     Order Specific Question:   Is the patient symptomatic?     Answer:   Yes       Medications Ordered This Encounter   Medications    cephALEXin (KEFLEX) 500 MG capsule     Sig: Take 1 capsule (500 mg total) by mouth 4 (four) times daily. for 7 days     Dispense:  28 capsule     Refill:  0    ketorolac (TORADOL) 10 mg tablet     Sig: Take 1 tablet (10 mg total) by mouth every 6 (six) hours. for 5 days     Dispense:  20 tablet     Refill:  0    phenazopyridine (PYRIDIUM) 200 MG tablet     Sig: Take 1 tablet (200 mg total) by mouth 3 (three) times daily as needed for Pain.     Dispense:  9 tablet     Refill:  0    tamsulosin (FLOMAX) 0.4 mg Cap     Sig: Take 1 capsule (0.4 mg total) by mouth 2 (two) times a day.     Dispense:  14 capsule     Refill:  0      Future Appointments     Date Provider Specialty Appt Notes    8/11/2022  Lab j    8/18/2022 Elias Rowan MD Family Medicine Med Refill    10/21/2022 Elias Rowan MD Family Medicine Annual           Elias Rowan MD

## 2022-08-12 LAB
25(OH)D3 SERPL-MCNC: 104 NG/ML (ref 30–100)
ALBUMIN SERPL-MCNC: 4.4 G/DL (ref 3.6–5.1)
ALBUMIN/GLOB SERPL: 1.6 (CALC) (ref 1–2.5)
ALP SERPL-CCNC: 91 U/L (ref 35–144)
ALT SERPL-CCNC: 49 U/L (ref 9–46)
AST SERPL-CCNC: 44 U/L (ref 10–35)
BASOPHILS # BLD AUTO: 19 CELLS/UL (ref 0–200)
BASOPHILS NFR BLD AUTO: 0.2 %
BILIRUB SERPL-MCNC: 0.6 MG/DL (ref 0.2–1.2)
BUN SERPL-MCNC: 23 MG/DL (ref 7–25)
BUN/CREAT SERPL: ABNORMAL (CALC) (ref 6–22)
CALCIUM SERPL-MCNC: 9.5 MG/DL (ref 8.6–10.3)
CHLORIDE SERPL-SCNC: 101 MMOL/L (ref 98–110)
CHOLEST SERPL-MCNC: 98 MG/DL
CHOLEST/HDLC SERPL: 1.8 (CALC)
CO2 SERPL-SCNC: 29 MMOL/L (ref 20–32)
CREAT SERPL-MCNC: 1.13 MG/DL (ref 0.7–1.28)
EGFR: 68 ML/MIN/1.73M2
EOSINOPHIL # BLD AUTO: 9 CELLS/UL (ref 15–500)
EOSINOPHIL NFR BLD AUTO: 0.1 %
ERYTHROCYTE [DISTWIDTH] IN BLOOD BY AUTOMATED COUNT: 12.6 % (ref 11–15)
GLOBULIN SER CALC-MCNC: 2.8 G/DL (CALC) (ref 1.9–3.7)
GLUCOSE SERPL-MCNC: 99 MG/DL (ref 65–99)
HBA1C MFR BLD: 5.5 % OF TOTAL HGB
HCT VFR BLD AUTO: 39 % (ref 38.5–50)
HDLC SERPL-MCNC: 54 MG/DL
HGB BLD-MCNC: 13.7 G/DL (ref 13.2–17.1)
LDLC SERPL CALC-MCNC: 31 MG/DL (CALC)
LYMPHOCYTES # BLD AUTO: 1974 CELLS/UL (ref 850–3900)
LYMPHOCYTES NFR BLD AUTO: 21 %
MCH RBC QN AUTO: 33.9 PG (ref 27–33)
MCHC RBC AUTO-ENTMCNC: 35.1 G/DL (ref 32–36)
MCV RBC AUTO: 96.5 FL (ref 80–100)
MONOCYTES # BLD AUTO: 1109 CELLS/UL (ref 200–950)
MONOCYTES NFR BLD AUTO: 11.8 %
NEUTROPHILS # BLD AUTO: 6289 CELLS/UL (ref 1500–7800)
NEUTROPHILS NFR BLD AUTO: 66.9 %
NONHDLC SERPL-MCNC: 44 MG/DL (CALC)
PLATELET # BLD AUTO: 192 THOUSAND/UL (ref 140–400)
PMV BLD REES-ECKER: 9.7 FL (ref 7.5–12.5)
POTASSIUM SERPL-SCNC: 3.9 MMOL/L (ref 3.5–5.3)
PROT SERPL-MCNC: 7.2 G/DL (ref 6.1–8.1)
PSA SERPL-MCNC: 0.77 NG/ML
RBC # BLD AUTO: 4.04 MILLION/UL (ref 4.2–5.8)
SODIUM SERPL-SCNC: 137 MMOL/L (ref 135–146)
TRIGL SERPL-MCNC: 57 MG/DL
TSH SERPL-ACNC: 0.8 MIU/L (ref 0.4–4.5)
WBC # BLD AUTO: 9.4 THOUSAND/UL (ref 3.8–10.8)

## 2022-08-18 ENCOUNTER — OFFICE VISIT (OUTPATIENT)
Dept: FAMILY MEDICINE | Facility: CLINIC | Age: 77
End: 2022-08-18
Payer: MEDICARE

## 2022-08-18 DIAGNOSIS — N20.0 KIDNEY STONE: Primary | ICD-10-CM

## 2022-08-18 DIAGNOSIS — N13.8 BPH WITH OBSTRUCTION/LOWER URINARY TRACT SYMPTOMS: ICD-10-CM

## 2022-08-18 DIAGNOSIS — R35.0 URINARY FREQUENCY: ICD-10-CM

## 2022-08-18 DIAGNOSIS — N40.1 BPH WITH OBSTRUCTION/LOWER URINARY TRACT SYMPTOMS: ICD-10-CM

## 2022-08-18 PROCEDURE — 1160F RVW MEDS BY RX/DR IN RCRD: CPT | Mod: CPTII,95,, | Performed by: FAMILY MEDICINE

## 2022-08-18 PROCEDURE — 99499 NO LOS: ICD-10-PCS | Mod: 95,,, | Performed by: FAMILY MEDICINE

## 2022-08-18 PROCEDURE — 3044F PR MOST RECENT HEMOGLOBIN A1C LEVEL <7.0%: ICD-10-PCS | Mod: CPTII,95,, | Performed by: FAMILY MEDICINE

## 2022-08-18 PROCEDURE — 4010F PR ACE/ARB THEARPY RXD/TAKEN: ICD-10-PCS | Mod: CPTII,95,, | Performed by: FAMILY MEDICINE

## 2022-08-18 PROCEDURE — 1159F PR MEDICATION LIST DOCUMENTED IN MEDICAL RECORD: ICD-10-PCS | Mod: CPTII,95,, | Performed by: FAMILY MEDICINE

## 2022-08-18 PROCEDURE — 1160F PR REVIEW ALL MEDS BY PRESCRIBER/CLIN PHARMACIST DOCUMENTED: ICD-10-PCS | Mod: CPTII,95,, | Performed by: FAMILY MEDICINE

## 2022-08-18 PROCEDURE — 4010F ACE/ARB THERAPY RXD/TAKEN: CPT | Mod: CPTII,95,, | Performed by: FAMILY MEDICINE

## 2022-08-18 PROCEDURE — 1159F MED LIST DOCD IN RCRD: CPT | Mod: CPTII,95,, | Performed by: FAMILY MEDICINE

## 2022-08-18 PROCEDURE — 99499 UNLISTED E&M SERVICE: CPT | Mod: 95,,, | Performed by: FAMILY MEDICINE

## 2022-08-18 PROCEDURE — 3044F HG A1C LEVEL LT 7.0%: CPT | Mod: CPTII,95,, | Performed by: FAMILY MEDICINE

## 2022-08-18 RX ORDER — TAMSULOSIN HYDROCHLORIDE 0.4 MG/1
0.4 CAPSULE ORAL DAILY
Qty: 90 CAPSULE | Refills: 4 | Status: SHIPPED | OUTPATIENT
Start: 2022-08-18 | End: 2024-02-08

## 2022-08-18 NOTE — ASSESSMENT & PLAN NOTE
Resolved. Discussed condition course and signs and symptoms to expect.  Patient advised take anti-inflammatories and or Tylenol for pain or fever.  ER precautions.  Call MD or follow-up to clinic if not improving or worsening symptoms.

## 2022-08-18 NOTE — PROGRESS NOTES
Primary Care Telemedicine Note  The patient location is:  Patient's Home - [place]  The chief complaint leading to consultation is:   Chief Complaint   Patient presents with    Follow-up      Total time:  see MDM below. The total time spent on the encounter, which includes face to face time and non-face to face time preparing to see the patient (eg, review of previous medical records, tests), Obtaining and/or reviewing separately obtained history, documenting clinical information in the electronic or other health record, independently interpreting results (not separately reported)/communicating results to the patient/family/caregiver, and/or care coordination (not separately reported).    Visit type: Virtual visit with synchronous audio only and video  Each patient to whom he or she provides medical services by telemedicine is:  (1) informed of the relationship between the physician and patient and the respective role of any other health care provider with respect to management of the patient; and (2) notified that he or she may decline to receive medical services by telemedicine and may withdraw from such care at any time.  =================================================================  PLAN:      Problem List Items Addressed This Visit     Kidney stone - Primary     Resolved. Discussed condition course and signs and symptoms to expect.  Patient advised take anti-inflammatories and or Tylenol for pain or fever.  ER precautions.  Call MD or follow-up to clinic if not improving or worsening symptoms.             BPH with obstruction/lower urinary tract symptoms     Continue Flomax.  Follow-up/establish care with Urology if no improvement.  Continue monitoring PSA.    The natural history of prostate cancer and ongoing controversy regarding screening and potential treatment outcomes of prostate cancer has been discussed with the patient. The meaning of a false positive PSA and a false negative PSA has been  discussed. He indicates understanding of the limitations of this screening test and wishes  to proceed with screening PSA testing.             Urinary frequency    Relevant Medications    tamsulosin (FLOMAX) 0.4 mg Cap        Future Appointments     Date Provider Specialty Appt Notes    10/21/2022 Elias Rowan MD Family Medicine Annual         Medication Management for assessment above:   Medication List with Changes/Refills   Current Medications    ASCORBIC ACID, VITAMIN C, (VITAMIN C) 500 MG TABLET    Take 500 mg by mouth once daily.    ASPIRIN (ECOTRIN) 81 MG EC TABLET    Take 1 tablet (81 mg total) by mouth once daily.    ATORVASTATIN (LIPITOR) 20 MG TABLET    Take 1 tablet (20 mg total) by mouth once daily.    FISH OIL-OMEGA-3 FATTY ACIDS 300-1,000 MG CAPSULE    Take by mouth once daily.    FOLIC ACID (FOLVITE) 1 MG TABLET    Take 1 mg by mouth once daily.    LISINOPRIL (PRINIVIL,ZESTRIL) 20 MG TABLET    TAKE 1 TABLET DAILY FOR BLOOD PRESSURE *THANK YOU*    SAW PALMETTO FRUIT 450 MG CAP    Take 4 capsules by mouth.    VALACYCLOVIR (VALTREX) 1000 MG TABLET    Take 1 tablet (1,000 mg total) by mouth 2 (two) times daily.    ZINC GLUCONATE 50 MG TABLET    Take 50 mg by mouth once daily.   Changed and/or Refilled Medications    Modified Medication Previous Medication    TAMSULOSIN (FLOMAX) 0.4 MG CAP tamsulosin (FLOMAX) 0.4 mg Cap       Take 1 capsule (0.4 mg total) by mouth once daily.    Take 1 capsule (0.4 mg total) by mouth 2 (two) times a day.   Discontinued Medications    CEPHALEXIN (KEFLEX) 500 MG CAPSULE    Take 1 capsule (500 mg total) by mouth 4 (four) times daily. for 7 days    ERGOCALCIFEROL (VITAMIN D2) 50,000 UNIT CAP    Take 1 capsule (50,000 Units total) by mouth every 7 days.    KETOROLAC (TORADOL) 10 MG TABLET    Take 1 tablet (10 mg total) by mouth every 6 (six) hours. for 5 days       Elias Rowan,  M.D.  ==========================================================================  Subjective:   Patient ID: Trell Rios is a 76 y.o. male.  has a past medical history of Encounter for long-term (current) use of medications (10/17/2019), Hyperlipidemia (10/17/2019), and Hypertension.   Chief Complaint: Follow-up      Problem List Items Addressed This Visit     Kidney stone - Primary    Overview     Patient reports that his pain has resolved with the treatment.  He feels well.  Urinary stream is been improved with Flomax.           Current Assessment & Plan     Resolved. Discussed condition course and signs and symptoms to expect.  Patient advised take anti-inflammatories and or Tylenol for pain or fever.  ER precautions.  Call MD or follow-up to clinic if not improving or worsening symptoms.             BPH with obstruction/lower urinary tract symptoms    Overview     No results found for: PSA    Latest Reference Range & Units 08/11/22 13:40   PROSTATE SPECIFIC ANTIGEN, SCR - [facility] < OR = 4.00 ng/mL 0.77     Patient reports improvement in stream with Flomax.  Requesting refill.           Current Assessment & Plan     Continue Flomax.  Follow-up/establish care with Urology if no improvement.  Continue monitoring PSA.    The natural history of prostate cancer and ongoing controversy regarding screening and potential treatment outcomes of prostate cancer has been discussed with the patient. The meaning of a false positive PSA and a false negative PSA has been discussed. He indicates understanding of the limitations of this screening test and wishes  to proceed with screening PSA testing.             Urinary frequency           Review of patient's allergies indicates:   Allergen Reactions    Sulfa (sulfonamide antibiotics) Rash     Current Outpatient Medications   Medication Instructions    ascorbic acid (vitamin C) (VITAMIN C) 500 mg, Oral, Daily    aspirin (ECOTRIN) 81 mg, Oral, Daily    atorvastatin (LIPITOR)  20 mg, Oral, Daily    fish oil-omega-3 fatty acids 300-1,000 mg capsule Oral, Daily    folic acid (FOLVITE) 1 mg, Oral, Daily    lisinopriL (PRINIVIL,ZESTRIL) 20 MG tablet TAKE 1 TABLET DAILY FOR BLOOD PRESSURE *THANK YOU*    saw palmetto fruit 450 mg Cap 4 capsules, Oral    tamsulosin (FLOMAX) 0.4 mg, Oral, Daily    valACYclovir (VALTREX) 1,000 mg, Oral, 2 times daily    zinc gluconate 50 mg, Oral, Daily      I have reviewed the PMH, social history, FamilyHx, surgical history, allergies and medications documented / confirmed by the patient at the time of this visit.  Review of Systems   Constitutional: Negative for activity change and unexpected weight change.   HENT: Negative for hearing loss, rhinorrhea and trouble swallowing.    Eyes: Negative for discharge and visual disturbance.   Respiratory: Negative for chest tightness and wheezing.    Cardiovascular: Negative for chest pain and palpitations.   Gastrointestinal: Negative for blood in stool, constipation, diarrhea and vomiting.   Endocrine: Negative for polydipsia and polyuria.   Genitourinary: Negative for difficulty urinating, hematuria and urgency.   Musculoskeletal: Negative for arthralgias, joint swelling and neck pain.   Neurological: Negative for weakness and headaches.   Psychiatric/Behavioral: Negative for confusion and dysphoric mood.     Objective:   There were no vitals taken for this visit.  Physical Exam  Constitutional:       General: He is not in acute distress.     Appearance: He is well-developed. He is not diaphoretic.   HENT:      Head: Normocephalic and atraumatic.   Eyes:      Pupils: Pupils are equal, round, and reactive to light.   Pulmonary:      Effort: Pulmonary effort is normal.   Musculoskeletal:         General: Normal range of motion.      Cervical back: Normal range of motion.   Skin:     Findings: No rash.   Neurological:      Mental Status: He is alert and oriented to person, place, and time.   Psychiatric:          Attention and Perception: He is attentive.         Mood and Affect: Mood is not anxious or depressed. Affect is not labile, blunt, angry or inappropriate.         Speech: He is communicative. Speech is not rapid and pressured, delayed, slurred or tangential.         Behavior: Behavior normal. Behavior is not agitated, slowed, aggressive, withdrawn, hyperactive or combative.         Thought Content: Thought content normal. Thought content is not paranoid or delusional. Thought content does not include homicidal or suicidal ideation. Thought content does not include homicidal or suicidal plan.         Cognition and Memory: Memory is not impaired.         Judgment: Judgment normal. Judgment is not impulsive or inappropriate.         Assessment:     1. Kidney stone    2. BPH with obstruction/lower urinary tract symptoms    3. Urinary frequency      MDM:     Total time: 7 minutes.  This includes total time spent on the encounter, which includes face to face time and non-face to face time preparing to see the patient (eg, review of previous medical records, tests), Obtaining and/or reviewing separately obtained history, documenting clinical information in the electronic or other health record, independently interpreting results (not separately reported)/communicating results to the patient/family/caregiver, and/or care coordination (not separately reported).    I have Reviewed and summarized old records.  I have performed thorough medication reconciliation today and discussed risk and benefits of medications.  I have reviewed labs and discussed with patient.  All questions were answered.  I am requesting old records and will review them once they are available.    I have signed for the following orders AND/OR meds.  No orders of the defined types were placed in this encounter.    Medications Ordered This Encounter   Medications    tamsulosin (FLOMAX) 0.4 mg Cap     Sig: Take 1 capsule (0.4 mg total) by mouth once daily.      Dispense:  90 capsule     Refill:  4        No follow-ups on file.  Future Appointments     Date Provider Specialty Appt Notes    10/21/2022 Elias Rowan MD Family Medicine Annual        If no improvement in symptoms or symptoms worsen, advised to call/follow-up at clinic or go to ER. Patient voiced understanding and all questions/concerns were addressed.   DISCLAIMER: This note was compiled by using a speech recognition dictation system and therefore please be aware that typographical / speech recognition errors can and do occur.  Please contact me if you see any errors specifically.    Elias Rowan M.D.       Office: [phone] [address] [place] [address]  FAX: [phone]

## 2022-08-18 NOTE — PATIENT INSTRUCTIONS
Follow up if symptoms worsen or fail to improve.     Dear patient,   As a result of recent federal legislation (The Federal Cures Act), you may receive lab or pathology results from your visit in your [facility] account before your physician is able to contact you. Your physician or their representative will relay the results to you with their recommendations at their soonest availability.     If no improvement in symptoms or symptoms worsen, please be advised to call MD, follow-up at clinic and/or go to ER if becomes severe.    Elias Rowan M.D.        We Offer TELEHEALTH & Same Day Appointments!   Book your Telehealth appointment with me through my nurse or   Clinic appointments on [employer]!    [address] [place] [address]    Office: [phone]   FAX: [phone]    Check out my Facebook Page and Follow Me at: [email or web address]    Check out my website at [employer] by clicking on: [email or web address]    To Schedule appointments online, go to [employer]: [email or web address]

## 2022-08-18 NOTE — ASSESSMENT & PLAN NOTE
Continue Flomax.  Follow-up/establish care with Urology if no improvement.  Continue monitoring PSA.    The natural history of prostate cancer and ongoing controversy regarding screening and potential treatment outcomes of prostate cancer has been discussed with the patient. The meaning of a false positive PSA and a false negative PSA has been discussed. He indicates understanding of the limitations of this screening test and wishes  to proceed with screening PSA testing.

## 2022-10-17 ENCOUNTER — PES CALL (OUTPATIENT)
Dept: ADMINISTRATIVE | Facility: CLINIC | Age: 77
End: 2022-10-17
Payer: MEDICARE

## 2022-11-09 ENCOUNTER — PES CALL (OUTPATIENT)
Dept: ADMINISTRATIVE | Facility: CLINIC | Age: 77
End: 2022-11-09
Payer: MEDICARE

## 2022-11-14 PROBLEM — N39.0 URINARY TRACT INFECTION WITHOUT HEMATURIA: Status: RESOLVED | Noted: 2022-08-11 | Resolved: 2022-11-14

## 2022-12-29 ENCOUNTER — OFFICE VISIT (OUTPATIENT)
Dept: FAMILY MEDICINE | Facility: CLINIC | Age: 77
End: 2022-12-29
Payer: MEDICARE

## 2022-12-29 VITALS — SYSTOLIC BLOOD PRESSURE: 130 MMHG | DIASTOLIC BLOOD PRESSURE: 87 MMHG

## 2022-12-29 DIAGNOSIS — I10 HYPERTENSION, ESSENTIAL: Primary | Chronic | ICD-10-CM

## 2022-12-29 DIAGNOSIS — A49.9 BACTERIAL INFECTION: ICD-10-CM

## 2022-12-29 DIAGNOSIS — T75.3XXD MOTION SICKNESS, SUBSEQUENT ENCOUNTER: ICD-10-CM

## 2022-12-29 DIAGNOSIS — Z79.899 ENCOUNTER FOR LONG-TERM (CURRENT) USE OF MEDICATIONS: Chronic | ICD-10-CM

## 2022-12-29 PROBLEM — T75.3XXA MOTION SICKNESS: Status: ACTIVE | Noted: 2022-12-29

## 2022-12-29 PROCEDURE — 3044F HG A1C LEVEL LT 7.0%: CPT | Mod: CPTII,95,, | Performed by: FAMILY MEDICINE

## 2022-12-29 PROCEDURE — 3079F PR MOST RECENT DIASTOLIC BLOOD PRESSURE 80-89 MM HG: ICD-10-PCS | Mod: CPTII,95,, | Performed by: FAMILY MEDICINE

## 2022-12-29 PROCEDURE — 1160F PR REVIEW ALL MEDS BY PRESCRIBER/CLIN PHARMACIST DOCUMENTED: ICD-10-PCS | Mod: CPTII,95,, | Performed by: FAMILY MEDICINE

## 2022-12-29 PROCEDURE — 3044F PR MOST RECENT HEMOGLOBIN A1C LEVEL <7.0%: ICD-10-PCS | Mod: CPTII,95,, | Performed by: FAMILY MEDICINE

## 2022-12-29 PROCEDURE — 4010F PR ACE/ARB THEARPY RXD/TAKEN: ICD-10-PCS | Mod: CPTII,95,, | Performed by: FAMILY MEDICINE

## 2022-12-29 PROCEDURE — 1159F PR MEDICATION LIST DOCUMENTED IN MEDICAL RECORD: ICD-10-PCS | Mod: CPTII,95,, | Performed by: FAMILY MEDICINE

## 2022-12-29 PROCEDURE — 1159F MED LIST DOCD IN RCRD: CPT | Mod: CPTII,95,, | Performed by: FAMILY MEDICINE

## 2022-12-29 PROCEDURE — 3075F PR MOST RECENT SYSTOLIC BLOOD PRESS GE 130-139MM HG: ICD-10-PCS | Mod: CPTII,95,, | Performed by: FAMILY MEDICINE

## 2022-12-29 PROCEDURE — 99212 OFFICE O/P EST SF 10 MIN: CPT | Mod: 95,,, | Performed by: FAMILY MEDICINE

## 2022-12-29 PROCEDURE — 99212 PR OFFICE/OUTPT VISIT, EST, LEVL II, 10-19 MIN: ICD-10-PCS | Mod: 95,,, | Performed by: FAMILY MEDICINE

## 2022-12-29 PROCEDURE — 1160F RVW MEDS BY RX/DR IN RCRD: CPT | Mod: CPTII,95,, | Performed by: FAMILY MEDICINE

## 2022-12-29 PROCEDURE — 3075F SYST BP GE 130 - 139MM HG: CPT | Mod: CPTII,95,, | Performed by: FAMILY MEDICINE

## 2022-12-29 PROCEDURE — 4010F ACE/ARB THERAPY RXD/TAKEN: CPT | Mod: CPTII,95,, | Performed by: FAMILY MEDICINE

## 2022-12-29 PROCEDURE — 3079F DIAST BP 80-89 MM HG: CPT | Mod: CPTII,95,, | Performed by: FAMILY MEDICINE

## 2022-12-29 RX ORDER — DOXYCYCLINE 100 MG/1
100 CAPSULE ORAL 2 TIMES DAILY
Qty: 60 CAPSULE | Refills: 5 | Status: SHIPPED | OUTPATIENT
Start: 2022-12-29 | End: 2024-02-08

## 2022-12-29 RX ORDER — SCOLOPAMINE TRANSDERMAL SYSTEM 1 MG/1
1 PATCH, EXTENDED RELEASE TRANSDERMAL
Qty: 8 PATCH | Refills: 0 | Status: SHIPPED | OUTPATIENT
Start: 2022-12-29

## 2022-12-29 RX ORDER — DOXYCYCLINE 100 MG/1
100 CAPSULE ORAL 2 TIMES DAILY
COMMUNITY
Start: 2022-11-10 | End: 2022-12-29 | Stop reason: SDUPTHER

## 2022-12-29 NOTE — ASSESSMENT & PLAN NOTE
Refill doxycycline.  Discussed condition course and signs and symptoms to expect.  Patient advised take anti-inflammatories and or Tylenol for pain or fever.  ER precautions.  Call MD or follow-up to clinic if not improving or worsening symptoms.

## 2022-12-29 NOTE — ASSESSMENT & PLAN NOTE
Avoid ACE inhibitor.  Counseled on importance of hypertension disease course, I recommend ongoing Education for DASH-diet and exercise.  Counseled on medication regimen importance of treating high blood pressure.  Please be advised of risk of untreated blood pressure as discussed.  Please advised of ER precautions were given for symptoms of hypertensive urgency and emergency.

## 2022-12-29 NOTE — PATIENT INSTRUCTIONS
Follow up if symptoms worsen or fail to improve.     Dear patient,   As a result of recent federal legislation (The Federal Cures Act), you may receive lab or pathology results from your visit in your [facility] account before your physician is able to contact you. Your physician or their representative will relay the results to you with their recommendations at their soonest availability.     If no improvement in symptoms or symptoms worsen, please be advised to call MD, follow-up at clinic and/or go to ER if becomes severe.    Elias Rowan M.D.        We Offer TELEHEALTH & Same Day Appointments!   Book your Telehealth appointment with me through my nurse or   Clinic appointments on [employer]!    [address] [place] [address]    Office: [phone]   FAX: [phone]    Check out my Facebook Page and Follow Me at: [email or web address]    Check out my website at [employer] by clicking on: [email or web address]    To Schedule appointments online, go to [email or web address]: [email or web address]

## 2023-02-28 ENCOUNTER — PES CALL (OUTPATIENT)
Dept: ADMINISTRATIVE | Facility: CLINIC | Age: 78
End: 2023-02-28
Payer: MEDICARE

## 2023-03-17 ENCOUNTER — PES CALL (OUTPATIENT)
Dept: ADMINISTRATIVE | Facility: CLINIC | Age: 78
End: 2023-03-17
Payer: MEDICARE

## 2023-04-19 ENCOUNTER — PATIENT MESSAGE (OUTPATIENT)
Dept: ADMINISTRATIVE | Facility: HOSPITAL | Age: 78
End: 2023-04-19
Payer: MEDICARE

## 2023-04-26 ENCOUNTER — PES CALL (OUTPATIENT)
Dept: ADMINISTRATIVE | Facility: CLINIC | Age: 78
End: 2023-04-26
Payer: MEDICARE

## 2023-07-03 ENCOUNTER — PES CALL (OUTPATIENT)
Dept: ADMINISTRATIVE | Facility: CLINIC | Age: 78
End: 2023-07-03
Payer: MEDICARE

## 2023-12-10 RX ORDER — VALACYCLOVIR HYDROCHLORIDE 1 G/1
1000 TABLET, FILM COATED ORAL 2 TIMES DAILY
Qty: 60 TABLET | Refills: 11 | Status: SHIPPED | OUTPATIENT
Start: 2023-12-10 | End: 2024-12-09

## 2024-01-29 ENCOUNTER — PATIENT OUTREACH (OUTPATIENT)
Dept: ADMINISTRATIVE | Facility: HOSPITAL | Age: 79
End: 2024-01-29
Payer: MEDICARE

## 2024-01-29 NOTE — PROGRESS NOTES
Patient not seen by PCP in 12 months or longer Report: Called Pt to schedule labs & PCP visit, Pt did not answer left voicemail with callback number.

## 2024-02-08 ENCOUNTER — CLINICAL SUPPORT (OUTPATIENT)
Dept: FAMILY MEDICINE | Facility: CLINIC | Age: 79
End: 2024-02-08
Payer: MEDICARE

## 2024-02-08 ENCOUNTER — OFFICE VISIT (OUTPATIENT)
Dept: FAMILY MEDICINE | Facility: CLINIC | Age: 79
End: 2024-02-08
Payer: MEDICARE

## 2024-02-08 VITALS
SYSTOLIC BLOOD PRESSURE: 136 MMHG | OXYGEN SATURATION: 95 % | HEART RATE: 70 BPM | WEIGHT: 188.63 LBS | DIASTOLIC BLOOD PRESSURE: 88 MMHG | BODY MASS INDEX: 25.55 KG/M2 | HEIGHT: 72 IN

## 2024-02-08 DIAGNOSIS — Z01.818 PREOP EXAMINATION: ICD-10-CM

## 2024-02-08 DIAGNOSIS — Z01.818 PREOP EXAMINATION: Primary | ICD-10-CM

## 2024-02-08 DIAGNOSIS — I10 HYPERTENSION, ESSENTIAL: ICD-10-CM

## 2024-02-08 DIAGNOSIS — Z12.5 ENCOUNTER FOR PROSTATE CANCER SCREENING: ICD-10-CM

## 2024-02-08 DIAGNOSIS — D53.9 NUTRITIONAL ANEMIA: ICD-10-CM

## 2024-02-08 DIAGNOSIS — E78.5 HYPERLIPIDEMIA, UNSPECIFIED HYPERLIPIDEMIA TYPE: ICD-10-CM

## 2024-02-08 DIAGNOSIS — Z79.899 ENCOUNTER FOR LONG-TERM (CURRENT) USE OF MEDICATIONS: ICD-10-CM

## 2024-02-08 DIAGNOSIS — Z12.11 COLON CANCER SCREENING: ICD-10-CM

## 2024-02-08 DIAGNOSIS — E55.9 VITAMIN D DEFICIENCY: ICD-10-CM

## 2024-02-08 DIAGNOSIS — M19.049 HAND ARTHRITIS: ICD-10-CM

## 2024-02-08 PROCEDURE — 93010 ELECTROCARDIOGRAM REPORT: CPT | Mod: S$GLB,,, | Performed by: STUDENT IN AN ORGANIZED HEALTH CARE EDUCATION/TRAINING PROGRAM

## 2024-02-08 PROCEDURE — 93005 ELECTROCARDIOGRAM TRACING: CPT | Mod: S$GLB,,, | Performed by: FAMILY MEDICINE

## 2024-02-08 PROCEDURE — 99999 PR PBB SHADOW E&M-EST. PATIENT-LVL V: CPT | Mod: PBBFAC,,, | Performed by: FAMILY MEDICINE

## 2024-02-08 PROCEDURE — 99214 OFFICE O/P EST MOD 30 MIN: CPT | Mod: S$GLB,,, | Performed by: FAMILY MEDICINE

## 2024-02-08 RX ORDER — PREDNISOLONE ACETATE 10 MG/ML
1 SUSPENSION/ DROPS OPHTHALMIC 4 TIMES DAILY
COMMUNITY
Start: 2024-02-05

## 2024-02-08 RX ORDER — ATORVASTATIN CALCIUM 20 MG/1
20 TABLET, FILM COATED ORAL DAILY
Qty: 90 TABLET | Refills: 4 | Status: SHIPPED | OUTPATIENT
Start: 2024-02-08 | End: 2025-05-03

## 2024-02-08 RX ORDER — DICLOFENAC SODIUM 10 MG/G
2 GEL TOPICAL DAILY
Qty: 100 G | Refills: 0 | Status: SHIPPED | OUTPATIENT
Start: 2024-02-08

## 2024-02-08 RX ORDER — ASCORBIC ACID 500 MG
500 TABLET ORAL DAILY
COMMUNITY
End: 2024-02-08

## 2024-02-08 RX ORDER — ASPIRIN 81 MG/1
81 TABLET ORAL DAILY
Qty: 90 TABLET | Refills: 11
Start: 2024-02-08

## 2024-02-08 RX ORDER — KETOROLAC TROMETHAMINE 5 MG/ML
1 SOLUTION OPHTHALMIC 4 TIMES DAILY
COMMUNITY
Start: 2024-02-05

## 2024-02-08 RX ORDER — CHOLECALCIFEROL (VITAMIN D3) 25 MCG
1000 TABLET ORAL DAILY
COMMUNITY

## 2024-02-08 RX ORDER — TOBRAMYCIN 3 MG/ML
1 SOLUTION/ DROPS OPHTHALMIC 4 TIMES DAILY
COMMUNITY
Start: 2024-02-05

## 2024-02-08 NOTE — PROGRESS NOTES
Patient ID: Trell Rios is a 78 y.o. male.  has a past medical history of Encounter for long-term (current) use of medications (10/17/2019), Hyperlipidemia (10/17/2019), and Hypertension.     Chief Complaint:  Preop, annual, med refills.    Well Adult Physical: Patient here for a comprehensive physical exam.The patient reports chronic problems.  The patient's last visit with me was on 12/29/2022.    February 2024: Patient presents to clinic today for preop evaluation for cataracts.  See form.  Hypertension:  Patient was having chronic cough to lisinopril.  This resolved when stopping the medication.  Blood pressure has been stable off of the medication.  Patient was having some issues with the  checking him in over insurance and his blood pressure is elevated as a result of this.  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  Denies any symptoms of high blood pressure.  No heart problems.  He is due for EKG today.  He has never had cardiac evaluation or workup with Cardiology.    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  Patient has been on multiple anti-inflammatories over the years.  He has tried meloxicam, ibuprofen, Celebrex, naproxen.  He is currently taking ibuprofen 800 milligrams 3 times daily.  He has not on any GI protection.  He has not tried Voltaren gel.    Reviewed care team:  Previous PCP Dr. Austin  Urology Dr. Dannie Nair    August 2022: Patient reports that he woke up last night with severe right-sided lower back/flank pain.  Difficulty urinating.  Patient with blood in his urine today.  CT scan was performed which shows abnormalities of ureteral stone.     Other findings were discussed with the patient.  CT Renal Stone Study ABD Pelvis WO  EXAMINATION:  CT RENAL STONE STUDY ABD PELVIS WO 08/11/2022 at 12:46    INDICATION:  Clinical history is abdominal, flank pain.  History  of previous inguinal hernia repair.  No history of recent trauma or surgery is provided.    COMPARISON:  No recent plain film is currently available.  No recent CT or ultrasound is currently available.    TECHNIQUE:  Axial non contrast-enhanced images are obtained of the abdomen and pelvis with coronal and sagittal reconstruction views using dose automated exposure control. Automatic dose reduction is utilized. DLP is 455 mGy cm.    FINDINGS  No lung base pneumothorax or lobar type consolidation is appreciated. No layering free air or free fluid collections are appreciated. There are subcentimeter para-aortic, mesenteric lymph nodes present.  Small hiatal hernia with slight gastroesophageal fold thickening is appreciated.  There is some ground-glass nodularity present anterior basal, right lung base measuring 3 x 5 mm image 10.  There are calcifications present suggestive of phleboliths.  The bladder is incompletely fluid-filled for evaluation which may exaggerate the wall thickness.  This can also be seen with chronic postinflammatory sequela.  Some aspects of the colon are under distended for evaluation although may be peristaltic related.  There is prostatic hypertrophy indenting the adjacent bladder margin.  There is an unremarkable appearance of the appendix demonstrated.  There is some trace gallbladder sludge present.  There is some punctate medullary nephrocalcinosis type appearance overall bilaterally.  There is  some fluid dense nephrogenic cystic appearance present on the left.  There is asymmetric right perinephric stranding and hydronephrosis, hydroureter corresponding with a distal ureteral UVJ calculus measuring 2 x 3 x 2 mm.  There is otherwise unremarkable overall appearance of the liver, gallbladder, adrenal glands, kidneys, spleen and pancreas.  No displaced fracture or dislocation is appreciated. There is abundant stool throughout the colon suggestive of constipation.  This limits mucosal detail  "evaluation.  There is slight decreased bone mineralization, degenerative change and atherosclerotic vascular calcifications appreciated.  This includes multilevel advanced degeneration overall throughout the thoracolumbar spine contributing to some osseous neural foraminal narrowing.  No previous comparison study is currently available to evaluate for stability or interval change.    IMPRESSION  1. There is unremarkable appearance of the appendix.  2.  There is abundant stool throughout the colon suggestive of constipation..  3. There is trace gallbladder sludge.  4. There is asymmetric right perinephric stranding demonstrated with hydronephrosis, hydroureter and a distal ureteral UVJ calculus measuring 2 x 3 x 2 mm.  5. There is some prostatic hypertrophy indenting the adjacent bladder margin.  6.  The bladder is incompletely fluid-filled for evaluation which may exaggerate the wall thickness.  This can also be seen with chronic postinflammatory sequela.  7. There is a 3 x 5 mm ground-glass nodule present at the right lung base.  Comparison to any previous older CT chest study could be performed if clinically available to evaluate for stability or interval change versus follow-up as per Fleischner society guidelines.    Electronically signed by: Yasir Palma MD  Date:    08/11/2022  Time:    12:56          Chronic. Vit d deficiency. Takes vitamin d supplement. No SE reported. Fatigue is slightly improved.   No results found for: "[id number]"     CHRONIC. STABLE. Lab analysis reviewed.   February 2024:  Hyperlipidemia Medications               fish oil-omega-3 fatty acids 300-1,000 mg capsule Take by mouth once daily.    atorvastatin (LIPITOR) 20 MG tablet Take 1 tablet (20 mg total) by mouth once daily.            (-) CP, SOB, abdominal pain, N/V/D, constipation, jaundice, skin changes.  (-) Myalgias  Lab Results   Component Value Date    CHOL 98 08/11/2022    CHOL 135 12/31/2020    CHOL 118 10/17/2019     Lab " "Results   Component Value Date    HDL 54 08/11/2022    HDL 56 12/31/2020    HDL 54 10/17/2019     Lab Results   Component Value Date    LDLCALC 31 08/11/2022    LDLCALC 58 12/31/2020    LDLCALC 41 10/17/2019     Lab Results   Component Value Date    TRIG 57 08/11/2022    TRIG 126 12/31/2020    TRIG 145 10/17/2019     Lab Results   Component Value Date    CHOLHDL 1.8 08/11/2022    CHOLHDL 2.4 12/31/2020    CHOLHDL 2.2 10/17/2019     No results found for: "TOTALCHOLEST"  Lab Results   Component Value Date    ALT 49 (H) 08/11/2022    AST 44 (H) 08/11/2022    ALKPHOS 99 10/17/2019    BILITOT 0.6 08/11/2022   The ASCVD Risk score (Josephine HURD, et al., 2019) failed to calculate for the following reasons:    The valid total cholesterol range is 130 to 320 mg/dL    ======================================================  Hypertension:  CHRONIC. STABLE. BP Reviewed.  Compliant with BP medications. No SE reported.   (-) CP, SOB, palpitations, dizziness, lightheadedness, HA, arm numbness, tingling or weakness, syncope.  Creatinine   Date Value Ref Range Status   08/11/2022 1.13 0.70 - 1.28 mg/dL Final     No results found for this or any previous visit.    Do you take any herbs or supplements that were not prescribed by a doctor?  Yes saw palmetto, zinc, folic acid, fish oil  Are you taking calcium supplements? no   Lab Results   Component Value Date    WBC 9.4 08/11/2022    HGB 13.7 08/11/2022    HCT 39.0 08/11/2022    MCV 96.5 08/11/2022     08/11/2022       No results found for: "IRON", "TIBC", "FERRITIN", "SATURATEDIRO"  Lab Results   Component Value Date    FTQGZYWO87 704 12/31/2020     No results found for: "FOLATE"    Chronic. Vit d deficiency. Takes vitamin d supplement. No SE reported. Fatigue is slightly improved.   No results found for: "[id number]"      History:  New onset ureterolithiasis August 2022  UROLOGIST:  None current  Date last PSA:  None available  The natural history of prostate cancer and " "ongoing controversy regarding screening and potential treatment outcomes of prostate cancer has been discussed with the patient. The meaning of a false positive PSA and a false negative PSA has been discussed. He indicates understanding of the limitations of this screening test and wishes  to proceed with screening PSA testing.    No results found for: "PSA" The natural history of prostate cancer and ongoing controversy regarding screening and potential treatment outcomes of prostate cancer has been discussed with the patient. The meaning of a false positive PSA and a false negative PSA has been discussed. He indicates understanding of the limitations of this screening test and wishes  to proceed with screening PSA testing.    Colon cancer screening:  Up-to-date on Cologuard    Health Maintenance Topics with due status: Not Due       Topic Last Completion Date    TETANUS VACCINE 07/21/2016    Lipid Panel 08/11/2022    negative Cologuard.  Repeat in three years.    ==============================================  History reviewed.     Health Maintenance Due   Topic Date Due    Shingles Vaccine (1 of 2) Never done    RSV Vaccine (Age 60+ and Pregnant patients) (1 - 1-dose 60+ series) Never done   Patient reports that he has been updated for the pneumonia vaccine and influenza.  Requesting records from [facility] pharmacy.  Patient gets vaccines from his work.  Past Medical History:  Past Medical History:   Diagnosis Date    Encounter for long-term (current) use of medications 10/17/2019     Patient is on CHRONIC long-term drug therapy for managed conditions. See medication list. Reports compliance.  No side effects reported.  Routine lab work is being monitored.  Patient does need refills today.   Lab Results Component Value Date  WBC 4.8 10/17/2019  HGB 15.7 10/17/2019  HCT 45.3 10/17/2019  MCV 96.6 10/17/2019   10/17/2019     Chemistry    Component Value Date/Time   1    Hyperlipidemia 10/17/2019     " This condition is chronic.  Currently stable.  Patient reports compliance with hyperlipidemia treatment as prescribed.  No side effects reported at this time.  Lab Results Component Value Date  CHOL 118 10/17/2019  Lab Results Component Value Date  HDL 54 10/17/2019  Lab Results Component Value Date  LDLCALC 41 10/17/2019  Lab Results Component Value Date  TRIG 145 10/17/2019  Lab Results Compone    Hypertension      Past Surgical History:   Procedure Laterality Date    HERNIA REPAIR       Review of patient's allergies indicates:   Allergen Reactions    Lisinopril     Sulfa (sulfonamide antibiotics) Rash     Current Outpatient Medications on File Prior to Visit   Medication Sig Dispense Refill    ascorbic acid, vitamin C, (VITAMIN C) 500 MG tablet Take 500 mg by mouth once daily.      fish oil-omega-3 fatty acids 300-1,000 mg capsule Take by mouth once daily.      folic acid (FOLVITE) 1 MG tablet Take 1 mg by mouth once daily.      ketorolac 0.5% (ACULAR) 0.5 % Drop Place 1 drop into both eyes 4 (four) times daily.      prednisoLONE acetate (PRED FORTE) 1 % DrpS Place 1 drop into both eyes 4 (four) times daily.      saw palmetto fruit 450 mg Cap Take 4 capsules by mouth.      scopolamine (TRANSDERM-SCOP) 1.3-1.5 mg (1 mg over 3 days) Place 1 patch onto the skin every 72 hours. 8 patch 0    tobramycin sulfate 0.3% (TOBREX) 0.3 % ophthalmic solution Place 1 drop into both eyes 4 (four) times daily.      valACYclovir (VALTREX) 1000 MG tablet Take 1 tablet (1,000 mg total) by mouth 2 (two) times daily. 60 tablet 11    zinc gluconate 50 mg tablet Take 50 mg by mouth once daily.      [DISCONTINUED] aspirin (ECOTRIN) 81 MG EC tablet Take 1 tablet (81 mg total) by mouth once daily. 90 tablet 11    [DISCONTINUED] atorvastatin (LIPITOR) 20 MG tablet Take 1 tablet (20 mg total) by mouth once daily. 90 tablet 4    [DISCONTINUED] doxycycline (VIBRAMYCIN) 100 MG Cap Take 1 capsule (100 mg total) by mouth 2 (two) times daily. 60  capsule 5    [DISCONTINUED] tamsulosin (FLOMAX) 0.4 mg Cap Take 1 capsule (0.4 mg total) by mouth once daily. 90 capsule 4    vitamin D (VITAMIN D3) 1000 units Tab Take 1,000 Units by mouth once daily.      [DISCONTINUED] ascorbic acid, vitamin C, (VITAMIN C) 500 MG tablet Take 500 mg by mouth once daily.       No current facility-administered medications on file prior to visit.     Social History     Socioeconomic History    Marital status:    Tobacco Use    Smoking status: Never    Smokeless tobacco: Never   Substance and Sexual Activity    Alcohol use: Never    Drug use: Never    Sexual activity: Yes     Partners: Female     Birth control/protection: Post-menopausal     Social Determinants of Health     Financial Resource Strain: Low Risk  (2/6/2024)    Overall Financial Resource Strain (CARDIA)     Difficulty of Paying Living Expenses: Not hard at all   Food Insecurity: No Food Insecurity (2/6/2024)    Hunger Vital Sign     Worried About Running Out of Food in the Last Year: Never true     Ran Out of Food in the Last Year: Never true   Transportation Needs: No Transportation Needs (2/6/2024)    PRAPARE - Transportation     Lack of Transportation (Medical): No     Lack of Transportation (Non-Medical): No   Physical Activity: Unknown (2/6/2024)    Exercise Vital Sign     Days of Exercise per Week: Patient declined   Stress: No Stress Concern Present (2/6/2024)    [employer] [place] [employer] - Occupational Stress Questionnaire     Feeling of Stress : Not at all   Social Connections: Unknown (2/6/2024)    Social Connection and Isolation Panel [NHANES]     Frequency of Communication with Friends and Family: More than three times a week     Frequency of Social Gatherings with Friends and Family: More than three times a week     Active Member of Clubs or Organizations: No     Marital Status:    Housing Stability: Unknown (2/6/2024)    Housing Stability Vital Sign     Unable to Pay for Housing  in the Last Year: No     Unstable Housing in the Last Year: No     Family History   Problem Relation Age of Onset    Cancer Father        Vitals:    02/08/24 1309   BP: (!) 160/100   Pulse: 70      Body mass index is 25.58 kg/m².     Review of Systems   Constitutional:  Negative for activity change and unexpected weight change.   HENT:  Negative for hearing loss, rhinorrhea and trouble swallowing.    Eyes:  Negative for discharge and visual disturbance.   Respiratory:  Negative for chest tightness and wheezing.    Cardiovascular:  Negative for chest pain and palpitations.   Gastrointestinal:  Negative for blood in stool, constipation, diarrhea and vomiting.   Endocrine: Negative for polydipsia and polyuria.   Genitourinary:  Negative for difficulty urinating, hematuria and urgency.   Musculoskeletal:  Positive for arthralgias. Negative for joint swelling and neck pain.   Neurological:  Negative for weakness and headaches.   Psychiatric/Behavioral:  Negative for confusion and dysphoric mood.           Objective:      Physical Exam  Vitals and nursing note reviewed.   Constitutional:       General: He is not in acute distress.     Appearance: He is well-developed. He is not diaphoretic.   HENT:      Head: Normocephalic and atraumatic.      Right Ear: External ear normal.      Left Ear: External ear normal.      Nose: Nose normal. No rhinorrhea.   Eyes:      Extraocular Movements: Extraocular movements intact.      Pupils: Pupils are equal, round, and reactive to light.   Cardiovascular:      Rate and Rhythm: Normal rate.      Pulses: Normal pulses.   Pulmonary:      Effort: Pulmonary effort is normal. No respiratory distress.      Breath sounds: Normal breath sounds.   Abdominal:      General: Bowel sounds are normal. There is no distension.      Palpations: Abdomen is soft.      Tenderness: There is no abdominal tenderness. There is no right CVA tenderness, left CVA tenderness, guarding or rebound.      Hernia: No  hernia is present.      Comments: Diastasis recti noted   Musculoskeletal:         General: Tenderness and deformity (hands) present. Normal range of motion.      Cervical back: Normal range of motion and neck supple.   Skin:     General: Skin is warm and dry.      Capillary Refill: Capillary refill takes less than 2 seconds.      Findings: No rash.   Neurological:      General: No focal deficit present.      Mental Status: He is alert and oriented to person, place, and time. Mental status is at baseline.      Cranial Nerves: No cranial nerve deficit.      Motor: No weakness.      Gait: Gait normal.   Psychiatric:         Attention and Perception: He is attentive.         Mood and Affect: Mood normal. Mood is not anxious or depressed. Affect is not labile, blunt, angry or inappropriate.         Speech: He is communicative. Speech is not rapid and pressured, delayed, slurred or tangential.         Behavior: Behavior normal. Behavior is not agitated, slowed, aggressive, withdrawn, hyperactive or combative.         Thought Content: Thought content normal. Thought content is not paranoid or delusional. Thought content does not include homicidal or suicidal ideation. Thought content does not include homicidal or suicidal plan.         Cognition and Memory: Memory is not impaired.         Judgment: Judgment normal. Judgment is not impulsive or inappropriate.          Assessment / Plan:      1.  Routine health exam-patient here for annual wellness visit.  Labs ordered.  Health maintenance was reviewed and ordered.    Arthralgias: Check uric acid.  Start diclofenac gel.  Patient can take low-dose anti-inflammatories however he should start GI protection with PPI while taking this medication regularly.  Blood pressure has been elevated today but normal at home off of medication.  Will continue to monitor closely.  Avoid ACE inhibitor due to chronic cough.  Consider amlodipine or other agent.  Check vitamin-D level.  Previous  plan:  Hypertension:  Continue current medication regimen.  Counseled on importance of hypertension disease course, I recommend ongoing Education for DASH-diet and exercise.  Counseled on medication regimen importance of treating high blood pressure.  Please be advised of risk of untreated blood pressure as discussed.  Please advised of ER precautions were given for symptoms of hypertensive urgency and emergency.  Hyperlipidemia:  Continue statin.  Counseled on hyperlipidemia disease course, healthy diet and increased need for exercise.  Please be advised of the risk of cardiovascular disease, increase stroke and heart attack risk with uncontrolled/untreated hyperlipidemia.     Patient voiced understanding and understood the treatment plan. All questions were answered.   Complete history and limited telemedicine physical was completed today.  Complete and thorough medication reconciliation was performed.  Discussed risks and benefits of medications.  Advised patient on orders and health maintenance.  We discussed old records and old labs if available.  Will request any records not available through epic.  Continue current medications listed on your summary sheet.    Ureterolithiasis with hydronephrosis of the right:  Seen on CT scan start Flomax, Keflex.  Toradol for mild-to-moderate pain.  Patient has Norco at home for severe pain.  Increase hydration with water.Discussed condition course and signs and symptoms to expect.  Patient advised take anti-inflammatories and or Tylenol for pain or fever.  ER precautions.  Call MD or follow-up to clinic if not improving or worsening symptoms.  Referral to Urology if no improvement.    Check labs.    Pulmonary nodule:  Incidental finding on recent imaging for kidney stone.  Plan to repeat CT scan or chest x-ray in one year.    Gallbladder sludge:  Caution for right upper quadrant pain.  Consider ultrasound if having any further issues with this.  General surgery consult if  having gallbladder concern.    Constipation:Please be advised constipation condition course.  I recommend increase daily water intake to an acceptable level.  Increase fiber.  Recommend stool softener and laxative if needed.  Goal of 1 bowel movement daily.  Follow-up to clinic or notify me if no improvement or symptoms are persistent or worsening.  ER precautions were given.  Recommend daily exercise as tolerated, adequate water intake (six 8-oz glasses of water daily), and high fiber diet. OTC fiber supplements are recommended if diet does not reach daily fiber goal (20-30 grams daily), such as Metamucil, Citrucel, or FiberCon (take as directed, separate from other oral medications by >2 hours).  - CONTINUE OTC stool softener such as Colace as directed to avoid hard stools and straining with bowel movements PRN  -If still no improvement with these measures, call/follow-up      All questions were answered. Patient had no further concerns. Advised of diagnoses and plan. Follow up as planned or return sooner if symptoms persist or worsen.     Orders Placed This Encounter   Procedures    CBC Without Differential     Standing Status:   Future     Number of Occurrences:   1     Standing Expiration Date:   4/8/2025    Comprehensive Metabolic Panel     Standing Status:   Future     Number of Occurrences:   1     Standing Expiration Date:   4/8/2025    TSH     Standing Status:   Future     Number of Occurrences:   1     Standing Expiration Date:   4/8/2025    Hemoglobin A1C     Standing Status:   Future     Number of Occurrences:   1     Standing Expiration Date:   4/8/2025    Lipid Panel     Standing Status:   Future     Number of Occurrences:   1     Standing Expiration Date:   4/8/2025    PSA, Screening     Standing Status:   Future     Number of Occurrences:   1     Standing Expiration Date:   4/8/2025    Vitamin D     Standing Status:   Future     Number of Occurrences:   1     Standing Expiration Date:   2/7/2025        Medications Ordered This Encounter   Medications    aspirin (ECOTRIN) 81 MG EC tablet     Sig: Take 1 tablet (81 mg total) by mouth once daily.     Dispense:  90 tablet     Refill:  11    atorvastatin (LIPITOR) 20 MG tablet     Sig: Take 1 tablet (20 mg total) by mouth once daily.     Dispense:  90 tablet     Refill:  4     Changing dose to 20mg, cancel 40mg Rx            Elias Rowan MD

## 2024-02-08 NOTE — PATIENT INSTRUCTIONS
Elian Cai,     If you are due for any health screening(s) below please notify me so we can arrange them to be ordered and scheduled. Most healthy patients at your age complete them, but you are free to accept or refuse.     If you can't do it, I'll definitely understand. If you can, I'd certainly appreciate it!    Tests to Keep You Healthy    Last Blood Pressure <= 139/89 (2/8/2024): NO      Lets manage your high blood pressure     Your blood pressure was above 140/90 today during your visit. We recommend that you schedule a nurse visit in two weeks to check your blood pressure and discuss ways to support your health goals.     You can also manage your health and record your blood pressure from the comfort of home by keeping a daily blood pressure log. These results are shared with and reviewed by your provider. Please print this form (Daily Blood Pressure Log) to assist you in keeping track of your blood pressure at home.     Schedule your nurse visit in two weeks to learn more about how to track and manage high blood pressure.    Daily Blood Pressure Log    Name:__________________________________                  Date of Birth:_________    Average Blood Pressure:  __________      Date: Time  (a.m.) Blood  Pressure: Pulse  Rate: Time  (p.m.) Blood  Pressure : Pulse  Rate:   Sample 8:37 127/83 84

## 2024-02-09 ENCOUNTER — TELEPHONE (OUTPATIENT)
Dept: FAMILY MEDICINE | Facility: CLINIC | Age: 79
End: 2024-02-09
Payer: MEDICARE

## 2024-02-09 DIAGNOSIS — Z01.818 PREOP EXAMINATION: Primary | ICD-10-CM

## 2024-02-09 NOTE — TELEPHONE ENCOUNTER
I have signed for the following orders AND/OR meds.  Please call the patient and ask the patient to schedule the testing AND/OR inform about any medications that were sent.      Orders Placed This Encounter   Procedures    IN OFFICE EKG 12-LEAD (to [place])     Standing Status:   Future     Standing Expiration Date:   2/9/2025

## 2024-02-13 LAB
OHS QRS DURATION: 126 MS
OHS QTC CALCULATION: 406 MS

## 2024-02-16 ENCOUNTER — PATIENT MESSAGE (OUTPATIENT)
Dept: ADMINISTRATIVE | Facility: HOSPITAL | Age: 79
End: 2024-02-16
Payer: MEDICARE

## 2024-02-16 ENCOUNTER — PATIENT OUTREACH (OUTPATIENT)
Dept: ADMINISTRATIVE | Facility: HOSPITAL | Age: 79
End: 2024-02-16
Payer: MEDICARE

## 2024-02-18 DIAGNOSIS — R94.31 ABNORMAL EKG: Primary | ICD-10-CM

## 2024-02-19 NOTE — PROGRESS NOTES
Abnormal EKG.  Patient should follow up with Cardiology.  Please make sure that they have an appointment with Dr. Berumen.

## 2024-02-22 ENCOUNTER — TELEPHONE (OUTPATIENT)
Dept: FAMILY MEDICINE | Facility: CLINIC | Age: 79
End: 2024-02-22
Payer: MEDICARE

## 2024-02-22 ENCOUNTER — CLINICAL SUPPORT (OUTPATIENT)
Dept: FAMILY MEDICINE | Facility: CLINIC | Age: 79
End: 2024-02-22
Payer: MEDICARE

## 2024-02-22 VITALS — DIASTOLIC BLOOD PRESSURE: 96 MMHG | HEART RATE: 76 BPM | SYSTOLIC BLOOD PRESSURE: 180 MMHG

## 2024-02-22 DIAGNOSIS — R94.31 ABNORMAL EKG: ICD-10-CM

## 2024-02-22 DIAGNOSIS — Z01.30 BP CHECK: Primary | ICD-10-CM

## 2024-02-22 DIAGNOSIS — I10 HYPERTENSION, UNSPECIFIED TYPE: Primary | ICD-10-CM

## 2024-02-22 PROCEDURE — 99999 PR PBB SHADOW E&M-EST. PATIENT-LVL II: CPT | Mod: PBBFAC,,,

## 2024-02-22 RX ORDER — AMLODIPINE BESYLATE 5 MG/1
5 TABLET ORAL DAILY
Qty: 30 TABLET | Refills: 11 | Status: SHIPPED | OUTPATIENT
Start: 2024-02-22 | End: 2025-02-21

## 2024-02-22 NOTE — PROGRESS NOTES
Blood pressure continues to be elevated.  Patient had cough with lisinopril.  Avoid ACE inhibitor or ARB.  Patient has had issues with beta-blockers in the past.  Will start amlodipine.  We discussed risk factors for heart disease and side effects.  Patient not having any symptoms today.  He has an appointment with Cardiology coming up soon.  EKG today shows normal sinus rhythm with right bundle branch block.  Hypertension Medications               amLODIPine (NORVASC) 5 MG tablet Take 1 tablet (5 mg total) by mouth once daily.              Counseled on importance of hypertension disease course, I recommend ongoing Education for DASH-diet and exercise.  Counseled on medication regimen importance of treating high blood pressure.  Please be advised of risk of untreated blood pressure as discussed.  Please advised of ER precautions were given for symptoms of hypertensive urgency and emergency.  Results for orders placed or performed in visit on 02/08/24   IN OFFICE EKG 12-LEAD (to [place])    Collection Time: 02/08/24  1:43 PM   Result Value Ref Range    QRS Duration 126 ms    [facility]HS QTC Calculation 406 ms    Narrative    Test Reason : Z01.818,    Vent. Rate : 062 BPM     Atrial Rate : 062 BPM     P-R Int : 188 ms          QRS Dur : 126 ms      QT Int : 400 ms       P-R-T Axes : 070 -06 049 degrees     QTc Int : 406 ms    Normal sinus rhythm  Right bundle branch block  Abnormal ECG  No previous ECGs available  Confirmed by Naseem Teague MD ([id number]) on 2/13/2024 6:01:49 AM    Referred By: RALPH CHO           Confirmed By:Naseem Teague MD

## 2024-02-22 NOTE — TELEPHONE ENCOUNTER
I have signed for the following orders AND/OR meds.  Please call the patient and ask the patient to schedule the testing AND/OR inform about any medications that were sent.      Orders Placed This Encounter   Procedures    IN OFFICE EKG 12-LEAD (to [place])     Standing Status:   Future     Standing Expiration Date:   2/22/2025       Medications Ordered This Encounter   Medications    amLODIPine (NORVASC) 5 MG tablet     Sig: Take 1 tablet (5 mg total) by mouth once daily.     Dispense:  30 tablet     Refill:  11     .

## 2024-02-26 RX ORDER — AMOXICILLIN AND CLAVULANATE POTASSIUM 875; 125 MG/1; MG/1
1 TABLET, FILM COATED ORAL 2 TIMES DAILY
Qty: 20 TABLET | Refills: 0 | Status: SHIPPED | OUTPATIENT
Start: 2024-02-26 | End: 2024-03-07

## 2024-02-26 RX ORDER — PROMETHAZINE HYDROCHLORIDE AND DEXTROMETHORPHAN HYDROBROMIDE 6.25; 15 MG/5ML; MG/5ML
5 SYRUP ORAL EVERY 4 HOURS PRN
Qty: 240 ML | Refills: 0 | Status: SHIPPED | OUTPATIENT
Start: 2024-02-26 | End: 2024-03-07

## 2024-03-29 ENCOUNTER — PATIENT MESSAGE (OUTPATIENT)
Dept: FAMILY MEDICINE | Facility: CLINIC | Age: 79
End: 2024-03-29
Payer: MEDICARE

## 2024-04-04 ENCOUNTER — OFFICE VISIT (OUTPATIENT)
Dept: CARDIOLOGY | Facility: CLINIC | Age: 79
End: 2024-04-04
Payer: MEDICARE

## 2024-04-04 VITALS
DIASTOLIC BLOOD PRESSURE: 80 MMHG | SYSTOLIC BLOOD PRESSURE: 166 MMHG | HEART RATE: 66 BPM | HEIGHT: 72 IN | BODY MASS INDEX: 25.26 KG/M2 | WEIGHT: 186.5 LBS

## 2024-04-04 DIAGNOSIS — R01.1 SYSTOLIC MURMUR: ICD-10-CM

## 2024-04-04 DIAGNOSIS — R06.09 DOE (DYSPNEA ON EXERTION): ICD-10-CM

## 2024-04-04 DIAGNOSIS — R09.89 CAROTID BRUIT, UNSPECIFIED LATERALITY: ICD-10-CM

## 2024-04-04 DIAGNOSIS — E78.00 PURE HYPERCHOLESTEROLEMIA: Chronic | ICD-10-CM

## 2024-04-04 DIAGNOSIS — R94.31 ABNORMAL EKG: Primary | ICD-10-CM

## 2024-04-04 DIAGNOSIS — I10 HYPERTENSION, ESSENTIAL: Chronic | ICD-10-CM

## 2024-04-04 PROCEDURE — 1126F AMNT PAIN NOTED NONE PRSNT: CPT | Mod: CPTII,S$GLB,, | Performed by: INTERNAL MEDICINE

## 2024-04-04 PROCEDURE — 1159F MED LIST DOCD IN RCRD: CPT | Mod: CPTII,S$GLB,, | Performed by: INTERNAL MEDICINE

## 2024-04-04 PROCEDURE — 3077F SYST BP >= 140 MM HG: CPT | Mod: CPTII,S$GLB,, | Performed by: INTERNAL MEDICINE

## 2024-04-04 PROCEDURE — 99999 PR PBB SHADOW E&M-EST. PATIENT-LVL IV: CPT | Mod: PBBFAC,,, | Performed by: INTERNAL MEDICINE

## 2024-04-04 PROCEDURE — 3288F FALL RISK ASSESSMENT DOCD: CPT | Mod: CPTII,S$GLB,, | Performed by: INTERNAL MEDICINE

## 2024-04-04 PROCEDURE — 3079F DIAST BP 80-89 MM HG: CPT | Mod: CPTII,S$GLB,, | Performed by: INTERNAL MEDICINE

## 2024-04-04 PROCEDURE — 1101F PT FALLS ASSESS-DOCD LE1/YR: CPT | Mod: CPTII,S$GLB,, | Performed by: INTERNAL MEDICINE

## 2024-04-04 PROCEDURE — 99204 OFFICE O/P NEW MOD 45 MIN: CPT | Mod: S$GLB,,, | Performed by: INTERNAL MEDICINE

## 2024-04-04 RX ORDER — OLMESARTAN MEDOXOMIL 20 MG/1
20 TABLET ORAL NIGHTLY
Qty: 90 TABLET | Refills: 0 | Status: SHIPPED | OUTPATIENT
Start: 2024-04-04 | End: 2025-04-04

## 2024-04-04 NOTE — PROGRESS NOTES
Subjective:    Patient ID:  Trell Rios is a 78 y.o. male who presents for Hyperlipidemia, Hypertension, and Abnormal ECG        HPI  NEW PATIENT EVALUATION ABNORMAL EKG THAT SHOWED RIGHT BUNDLE-BRANCH BLOCK UNCONTROLLED HYPERTENSION, LIPIDS FROM 2021 LDL 31, HBA1C NORMAL, DOING OK,COUGH WITH LISINOPRIL,. CHANGED TO NORVASC 5 MG,  SEE ROS    Past Medical History:   Diagnosis Date    Encounter for long-term (current) use of medications 10/17/2019     Patient is on CHRONIC long-term drug therapy for managed conditions. See medication list. Reports compliance.  No side effects reported.  Routine lab work is being monitored.  Patient does need refills today.   Lab Results Component Value Date  WBC 4.8 10/17/2019  HGB 15.7 10/17/2019  HCT 45.3 10/17/2019  MCV 96.6 10/17/2019   10/17/2019     Chemistry    Component Value Date/Time   1    Hyperlipidemia 10/17/2019     This condition is chronic.  Currently stable.  Patient reports compliance with hyperlipidemia treatment as prescribed.  No side effects reported at this time.  Lab Results Component Value Date  CHOL 118 10/17/2019  Lab Results Component Value Date  HDL 54 10/17/2019  Lab Results Component Value Date  LDLCALC 41 10/17/2019  Lab Results Component Value Date  TRIG 145 10/17/2019  Lab Results Compone    Hypertension      Past Surgical History:   Procedure Laterality Date    HERNIA REPAIR       Family History   Problem Relation Age of Onset    Cancer Father      Social History     Socioeconomic History    Marital status:    Tobacco Use    Smoking status: Never    Smokeless tobacco: Never   Substance and Sexual Activity    Alcohol use: Never    Drug use: Never    Sexual activity: Yes     Partners: Female     Birth control/protection: Post-menopausal     Social Determinants of Health     Financial Resource Strain: Low Risk  (2/6/2024)    Overall Financial Resource Strain (CARDIA)     Difficulty of Paying Living Expenses: Not hard at all   Food  Insecurity: No Food Insecurity (2/6/2024)    Hunger Vital Sign     Worried About Running Out of Food in the Last Year: Never true     Ran Out of Food in the Last Year: Never true   Transportation Needs: No Transportation Needs (2/6/2024)    PRAPARE - Transportation     Lack of Transportation (Medical): No     Lack of Transportation (Non-Medical): No   Physical Activity: Unknown (2/6/2024)    Exercise Vital Sign     Days of Exercise per Week: Patient declined   Stress: No Stress Concern Present (2/6/2024)    [employer] [place] [employer] - Occupational Stress Questionnaire     Feeling of Stress : Not at all   Social Connections: Unknown (2/6/2024)    Social Connection and Isolation Panel [NHANES]     Frequency of Communication with Friends and Family: More than three times a week     Frequency of Social Gatherings with Friends and Family: More than three times a week     Active Member of Clubs or Organizations: No     Marital Status:    Housing Stability: Unknown (2/6/2024)    Housing Stability Vital Sign     Unable to Pay for Housing in the Last Year: No     Unstable Housing in the Last Year: No       Review of patient's allergies indicates:   Allergen Reactions    Lisinopril     Sulfa (sulfonamide antibiotics) Rash       Current Outpatient Medications:     amLODIPine (NORVASC) 5 MG tablet, Take 1 tablet (5 mg total) by mouth once daily., Disp: 30 tablet, Rfl: 11    ascorbic acid, vitamin C, (VITAMIN C) 500 MG tablet, Take 500 mg by mouth once daily., Disp: , Rfl:     aspirin (ECOTRIN) 81 MG EC tablet, Take 1 tablet (81 mg total) by mouth once daily., Disp: 90 tablet, Rfl: 11    atorvastatin (LIPITOR) 20 MG tablet, Take 1 tablet (20 mg total) by mouth once daily., Disp: 90 tablet, Rfl: 4    diclofenac sodium (VOLTAREN) 1 % Gel, Apply 2 g topically once daily., Disp: 100 g, Rfl: 0    fish oil-omega-3 fatty acids 300-1,000 mg capsule, Take by mouth once daily., Disp: , Rfl:     folic acid (FOLVITE) 1  MG tablet, Take 1 mg by mouth once daily., Disp: , Rfl:     ketorolac 0.5% (ACULAR) 0.5 % Drop, Place 1 drop into both eyes 4 (four) times daily., Disp: , Rfl:     saw palmetto fruit 450 mg Cap, Take 4 capsules by mouth., Disp: , Rfl:     scopolamine (TRANSDERM-SCOP) 1.3-1.5 mg (1 mg over 3 days), Place 1 patch onto the skin every 72 hours., Disp: 8 patch, Rfl: 0    tobramycin sulfate 0.3% (TOBREX) 0.3 % ophthalmic solution, Place 1 drop into both eyes 4 (four) times daily., Disp: , Rfl:     vitamin D (VITAMIN D3) 1000 units Tab, Take 1,000 Units by mouth once daily., Disp: , Rfl:     zinc gluconate 50 mg tablet, Take 50 mg by mouth once daily., Disp: , Rfl:     olmesartan (BENICAR) 20 MG tablet, Take 1 tablet (20 mg total) by mouth every evening., Disp: 90 tablet, Rfl: 0    prednisoLONE acetate (PRED FORTE) 1 % DrpS, Place 1 drop into both eyes 4 (four) times daily., Disp: , Rfl:     valACYclovir (VALTREX) 1000 MG tablet, Take 1 tablet (1,000 mg total) by mouth 2 (two) times daily. (Patient not taking: Reported on 4/4/2024), Disp: 60 tablet, Rfl: 11    Review of Systems   Constitutional: Negative for chills, diaphoresis, fever, malaise/fatigue and night sweats.   HENT:  Negative for congestion, hearing loss and nosebleeds.    Eyes:  Negative for blurred vision, double vision and visual disturbance.   Cardiovascular:  Negative for chest pain, claudication, cyanosis, dyspnea on exertion (MINIMAL), irregular heartbeat, leg swelling, near-syncope, orthopnea, palpitations, paroxysmal nocturnal dyspnea and syncope.   Respiratory:  Negative for cough, hemoptysis, shortness of breath and wheezing.    Endocrine: Negative for cold intolerance, heat intolerance and polyuria.   Hematologic/Lymphatic: Negative for adenopathy. Does not bruise/bleed easily.   Skin:  Negative for color change, itching and nail changes.   Musculoskeletal:  Positive for arthritis. Negative for back pain and falls.   Gastrointestinal:  Negative for  abdominal pain, change in bowel habit, constipation, dysphagia, jaundice, melena and vomiting.   Genitourinary:  Negative for dysuria and flank pain.   Neurological:  Negative for brief paralysis, dizziness, focal weakness, light-headedness, loss of balance, numbness, paresthesias, tremors and weakness.   Psychiatric/Behavioral:  Negative for altered mental status, depression and memory loss.    Allergic/Immunologic: Negative for hives and persistent infections.        Objective:      Vitals:    04/04/24 1159 04/04/24 1217   BP: (!) 161/82 (!) 166/80   Pulse: 66    Weight: 84.6 kg (186 lb 8.2 oz)    Height: 6' (1.829 m)    PainSc: 0-No pain      Body mass index is 25.3 kg/m².    Physical Exam  Constitutional:       Appearance: Normal appearance.   HENT:      Head: Normocephalic and atraumatic.   Eyes:      Extraocular Movements: Extraocular movements intact.      Conjunctiva/sclera: Conjunctivae normal.   Neck:      Vascular: Normal carotid pulses. Carotid bruit present. No JVD.   Cardiovascular:      Rate and Rhythm: Normal rate and regular rhythm.      Pulses:           Carotid pulses are 2+ on the right side with bruit and 2+ on the left side with bruit.       Radial pulses are 2+ on the right side and 2+ on the left side.        Posterior tibial pulses are 2+ on the right side and 2+ on the left side.      Heart sounds: Murmur heard.      Systolic murmur is present with a grade of 2/6 at the upper right sternal border radiating to the neck.      Systolic murmur of grade 2/6 is also present at the lower left sternal border.      No gallop.   Pulmonary:      Effort: Pulmonary effort is normal. No respiratory distress.      Breath sounds: Normal breath sounds and air entry.   Abdominal:      Palpations: Abdomen is soft. There is no hepatomegaly.      Tenderness: There is no abdominal tenderness.   Musculoskeletal:      Cervical back: Neck supple.      Right lower leg: No edema.      Left lower leg: No edema.    Skin:     General: Skin is warm and dry.      Capillary Refill: Capillary refill takes less than 2 seconds.   Neurological:      General: No focal deficit present.      Mental Status: He is alert and oriented to person, place, and time.      Cranial Nerves: Cranial nerves 2-12 are intact. No cranial nerve deficit.   Psychiatric:         Mood and Affect: Mood normal.         Speech: Speech normal.         Behavior: Behavior normal.                 ..    Chemistry        Component Value Date/Time     08/11/2022 1340    K 3.9 08/11/2022 1340     08/11/2022 1340    CO2 29 08/11/2022 1340    BUN 23 08/11/2022 1340    CREATININE 1.13 08/11/2022 1340    GLU 99 08/11/2022 1340        Component Value Date/Time    CALCIUM 9.5 08/11/2022 1340    ALKPHOS 99 10/17/2019 1200    AST 44 (H) 08/11/2022 1340    ALT 49 (H) 08/11/2022 1340    BILITOT 0.6 08/11/2022 1340    ESTGFRAFRICA 98 12/31/2020 0759    EGFRNONAA 84 12/31/2020 0759            ..  Lab Results   Component Value Date    CHOL 98 08/11/2022    CHOL 135 12/31/2020    CHOL 118 10/17/2019     Lab Results   Component Value Date    HDL 54 08/11/2022    HDL 56 12/31/2020    HDL 54 10/17/2019     Lab Results   Component Value Date    LDLCALC 31 08/11/2022    LDLCALC 58 12/31/2020    LDLCALC 41 10/17/2019     Lab Results   Component Value Date    TRIG 57 08/11/2022    TRIG 126 12/31/2020    TRIG 145 10/17/2019     Lab Results   Component Value Date    CHOLHDL 1.8 08/11/2022    CHOLHDL 2.4 12/31/2020    CHOLHDL 2.2 10/17/2019     ..  Lab Results   Component Value Date    WBC 9.4 08/11/2022    HGB 13.7 08/11/2022    HCT 39.0 08/11/2022    MCV 96.5 08/11/2022     08/11/2022       Test(s) Reviewed  I have reviewed the following in detail:  [] Stress test   [] Angiography   [] Echocardiogram   [x] Labs   [x] Other:       Assessment:         ICD-10-CM ICD-9-CM   1. Abnormal EKG  R94.31 794.31   2. Hypertension, essential  I10 401.9   3. Carotid bruit, unspecified  laterality  R09.89 785.9   4. Pure hypercholesterolemia  E78.00 272.0   5. STARK (dyspnea on exertion)  R06.09 786.09   6. Systolic murmur  R01.1 785.2     Problem List Items Addressed This Visit          Cardiac/Vascular    Hypertension, essential (Chronic)    Overview     December 2022: Patient reports that he had a dry hacking cough for several months.  Patient held lisinopril and the cough went away.  Patient's blood pressure remains less than 140/90 off of the medication.  December 2020:  Patient reports blood pressure log has been averaging 130/80.  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  Blood pressure cuff has been validated.  November 2021:  Reports blood pressure well controlled  CHRONIC. STABLE. BP Reviewed.  Compliant with BP medications. No SE reported.   (-) CP, SOB, palpitations, dizziness, lightheadedness, HA, arm numbness, tingling or weakness, syncope.  Creatinine   Date Value Ref Range Status   08/11/2022 1.13 0.70 - 1.28 mg/dL Final   No results found for this or any previous visit.           Hyperlipidemia (Chronic)    Overview       CHRONIC. STABLE. Lab analysis reviewed.   (-) CP, SOB, abdominal pain, N/V/D, constipation, jaundice, skin changes.  (-) Myalgias  Lab Results   Component Value Date    CHOL 135 12/31/2020    CHOL 118 10/17/2019     Lab Results   Component Value Date    HDL 56 12/31/2020    HDL 54 10/17/2019     Lab Results   Component Value Date    LDLCALC 58 12/31/2020    LDLCALC 41 10/17/2019     Lab Results   Component Value Date    TRIG 126 12/31/2020    TRIG 145 10/17/2019     Lab Results   Component Value Date    CHOLHDL 2.4 12/31/2020    CHOLHDL 2.2 10/17/2019   No results found for: TOTALCHOLEST  Lab Results   Component Value Date    ALT 36 12/31/2020    AST 25 12/31/2020    ALKPHOS 99 10/17/2019    BILITOT 0.6 12/31/2020   ======================================================  The 10-year ASCVD risk score ([employer] WALTON Jr., et al., 2013)  is: 23.2%    Values used to calculate the score:      Age: 76 years      Sex: Male      Is Non- : No      Diabetic: No      Tobacco smoker: No      Systolic Blood Pressure: 130 mmHg      Is BP treated: Yes      HDL Cholesterol: 56 mg/dL      Total Cholesterol: 135 mg/dL             Abnormal EKG - Primary    Relevant Orders    Nuclear Stress - Cardiology Interpreted    Systolic murmur    Relevant Orders    Echo    Nuclear Stress - Cardiology Interpreted    STARK (dyspnea on exertion)    Relevant Orders    Nuclear Stress - Cardiology Interpreted    Carotid bruit    Relevant Orders    CV Ultrasound Bilateral Doppler Carotid        Plan:     ADD BENICAR, FOR BLOOD PRESSURE CONTROL, CHECK LABS SOON, ECHO, CAROTIDS, NUCLEAR STRESS TEST TO ASSESS FOR ISCHEMIA, NO OVERT HEART FAILURE SIZE STAY ACTIVE STAY WELL HYDRATED, RETURN TO CLINIC AFTER TESTS, BLOOD PRESSURE LOG      Abnormal EKG  -     Ambulatory referral/consult to Cardiology  -     Nuclear Stress - Cardiology Interpreted; Future    Hypertension, essential    Carotid bruit, unspecified laterality  Comments:  CAROTID ULTRASOUND  Orders:  -     CV Ultrasound Bilateral Doppler Carotid; Future    Pure hypercholesterolemia    STARK (dyspnea on exertion)  -     Nuclear Stress - Cardiology Interpreted; Future    Systolic murmur  Comments:  ECHO  Orders:  -     Echo  -     Nuclear Stress - Cardiology Interpreted; Future    Other orders  -     olmesartan (BENICAR) 20 MG tablet; Take 1 tablet (20 mg total) by mouth every evening.  Dispense: 90 tablet; Refill: 0    RTC Low level/low impact aerobic exercise 5x's/wk. Heart healthy diet and risk factor modification.    See labs and med orders.    Aerobic exercise 5x's/wk. Heart healthy diet and risk factor modification.    See labs and med orders.

## 2024-04-10 LAB — NONINV COLON CA DNA+OCC BLD SCRN STL QL: NEGATIVE

## 2024-04-21 LAB
25(OH)D3+25(OH)D2 SERPL-MCNC: 64 NG/ML (ref 30–100)
ALBUMIN SERPL-MCNC: 4.6 G/DL (ref 3.6–5.1)
ALBUMIN/GLOB SERPL: 1.8 (CALC) (ref 1–2.5)
ALP SERPL-CCNC: 95 U/L (ref 35–144)
ALT SERPL-CCNC: 28 U/L (ref 9–46)
AST SERPL-CCNC: 22 U/L (ref 10–35)
BILIRUB SERPL-MCNC: 0.5 MG/DL (ref 0.2–1.2)
BUN SERPL-MCNC: 23 MG/DL (ref 7–25)
BUN/CREAT SERPL: ABNORMAL (CALC) (ref 6–22)
CALCIUM SERPL-MCNC: 9.5 MG/DL (ref 8.6–10.3)
CHLORIDE SERPL-SCNC: 104 MMOL/L (ref 98–110)
CHOLEST SERPL-MCNC: 126 MG/DL
CHOLEST/HDLC SERPL: 2.4 (CALC)
CO2 SERPL-SCNC: 26 MMOL/L (ref 20–32)
CREAT SERPL-MCNC: 0.98 MG/DL (ref 0.7–1.28)
EGFR: 80 ML/MIN/1.73M2
ERYTHROCYTE [DISTWIDTH] IN BLOOD BY AUTOMATED COUNT: 13.5 % (ref 11–15)
FERRITIN SERPL-MCNC: 59 NG/ML (ref 24–380)
FOLATE SERPL-MCNC: >24 NG/ML
GLOBULIN SER CALC-MCNC: 2.6 G/DL (CALC) (ref 1.9–3.7)
GLUCOSE SERPL-MCNC: 111 MG/DL (ref 65–99)
HBA1C MFR BLD: 6.1 % OF TOTAL HGB
HCT VFR BLD AUTO: 40.1 % (ref 38.5–50)
HDLC SERPL-MCNC: 52 MG/DL
HGB BLD-MCNC: 13.5 G/DL (ref 13.2–17.1)
IRON SATN MFR SERPL: 36 % (CALC) (ref 20–48)
IRON SERPL-MCNC: 130 MCG/DL (ref 50–180)
LDLC SERPL CALC-MCNC: 54 MG/DL (CALC)
MCH RBC QN AUTO: 32.5 PG (ref 27–33)
MCHC RBC AUTO-ENTMCNC: 33.7 G/DL (ref 32–36)
MCV RBC AUTO: 96.6 FL (ref 80–100)
NONHDLC SERPL-MCNC: 74 MG/DL (CALC)
PLATELET # BLD AUTO: 199 THOUSAND/UL (ref 140–400)
PMV BLD REES-ECKER: 9.2 FL (ref 7.5–12.5)
POTASSIUM SERPL-SCNC: 4.1 MMOL/L (ref 3.5–5.3)
PROT SERPL-MCNC: 7.2 G/DL (ref 6.1–8.1)
PSA SERPL-MCNC: 1.44 NG/ML
RBC # BLD AUTO: 4.15 MILLION/UL (ref 4.2–5.8)
SODIUM SERPL-SCNC: 139 MMOL/L (ref 135–146)
TIBC SERPL-MCNC: 357 MCG/DL (CALC) (ref 250–425)
TRIGL SERPL-MCNC: 110 MG/DL
TSH SERPL-ACNC: 1.14 MIU/L (ref 0.4–4.5)
URATE SERPL-MCNC: 4 MG/DL (ref 4–8)
VIT B12 SERPL-MCNC: 630 PG/ML (ref 200–1100)
WBC # BLD AUTO: 4.5 THOUSAND/UL (ref 3.8–10.8)

## 2024-04-22 ENCOUNTER — TELEPHONE (OUTPATIENT)
Dept: FAMILY MEDICINE | Facility: CLINIC | Age: 79
End: 2024-04-22
Payer: MEDICARE

## 2024-04-22 DIAGNOSIS — R73.03 PREDIABETES: Primary | ICD-10-CM

## 2024-04-22 NOTE — PROGRESS NOTES
Make follow-up lab appointment per recommendation below.  Check to see if patient has seen the results through my chart.  If not then,  #CALL THE PATIENT# to discuss results/see if they have questions and document verification of contact. Make F/U appt if needed. [phone]    #My interpretation that was sent to them through [employer]:  PATSY ARAGON, I have reviewed your recent blood work.     PSA screening for prostate cancer is within normal limits.  Repeat annually.  Your complete blood count is normal.  Normal uric acid level.  Normal B12 level.  Normal folate level.  Normal iron level.  Normal vitamin-D level.  Your metabolic panel which shows your glucose, kidney function, electrolytes, and liver function is mostly within normal limits except for elevated glucose.   Thyroid study is normal.   Your cholesterol is well-controlled.    Your hemoglobin A1c is significantly worse from previous.  This level is consistent with prediabetes.  I recommend a low-carbohydrate diet and increase in exercise.  Recheck A1c level in three months.  This test is gold standard screening test for diabetes.  It is a measures 3 months of your average blood sugar.    =========================  Also please address any outstanding health maintenance that may be due: Shingles Vaccine(1 of 2) Never done  RSV Vaccine (Age 60+ and Pregnant patients)(1 - 1-dose 60+ series) Never done

## 2024-04-22 NOTE — TELEPHONE ENCOUNTER
----- Message from Elias Rowan MD sent at 4/21/2024  9:22 PM CDT -----  Make follow-up lab appointment per recommendation below.  Check to see if patient has seen the results through my chart.  If not then,  #CALL THE PATIENT# to discuss results/see if they have questions and document verification of contact. Make F/U appt if needed. [phone]    #My interpretation that was sent to them through [employer]:  PATSY ARAGON, I have reviewed your recent blood work.     PSA screening for prostate cancer is within normal limits.  Repeat annually.  Your complete blood count is normal.  Normal uric acid level.  Normal B12 level.  Normal folate level.  Normal iron level.  Normal vitamin-D level.  Your metabolic panel which shows your glucose, kidney function, electrolytes, and liver function is mostly within normal limits except for elevated glucose.   Thyroid study is normal.   Your cholesterol is well-controlled.    Your hemoglobin A1c is significantly worse from previous.  This level is consistent with prediabetes.  I recommend a low-carbohydrate diet and increase in exercise.  Recheck A1c level in three months.  This test is gold standard screening test for diabetes.  It is a measures 3 months of your average blood sugar.    =========================  Also please address any outstanding health maintenance that may be due: Shingles Vaccine(1 of 2) Never done  RSV Vaccine (Age 60+ and Pregnant patients)(1 - 1-dose 60+ series) Never done

## 2024-04-22 NOTE — TELEPHONE ENCOUNTER
".Reason for Call:  Medication or medication refill:    Do you use a [facility]?  Name of the pharmacy and phone number for the current request:  [facility] [phone]    Name of the medication requested:    valACYclovir (VALTREX) 1000 mg tablet () 2,000 mg, 2 TIMES DAILY         Other request: Patient states she is in real need for this medication; has taken it for \"a while\" Patient stated;     Can we leave a detailed message on this number? YES    Phone number patient can be reached at: Home number on file [phone] (home)    Best Time: anytime    Call taken on 2020 at 1:39 PM by Susan Rushing      " A1c to be scheduled in 3 months

## 2024-04-23 ENCOUNTER — PATIENT MESSAGE (OUTPATIENT)
Dept: RADIOLOGY | Facility: HOSPITAL | Age: 79
End: 2024-04-23
Payer: MEDICARE

## 2024-04-23 NOTE — TELEPHONE ENCOUNTER
I have signed for the following orders AND/OR meds.  Please call the patient and ask the patient to schedule the testing AND/OR inform about any medications that were sent.      Orders Placed This Encounter   Procedures    HEMOGLOBIN A1C     Standing Status:   Future     Standing Expiration Date:   7/21/2025

## 2024-04-25 ENCOUNTER — HOSPITAL ENCOUNTER (OUTPATIENT)
Dept: CARDIOLOGY | Facility: HOSPITAL | Age: 79
Discharge: HOME | End: 2024-04-25
Attending: INTERNAL MEDICINE
Payer: MEDICARE

## 2024-04-25 ENCOUNTER — HOSPITAL ENCOUNTER (OUTPATIENT)
Dept: RADIOLOGY | Facility: HOSPITAL | Age: 79
Discharge: HOME | End: 2024-04-25
Attending: INTERNAL MEDICINE
Payer: MEDICARE

## 2024-04-25 VITALS — HEIGHT: 72 IN | WEIGHT: 186 LBS | BODY MASS INDEX: 25.19 KG/M2

## 2024-04-25 VITALS — WEIGHT: 186 LBS | HEIGHT: 72 IN | BODY MASS INDEX: 25.19 KG/M2

## 2024-04-25 DIAGNOSIS — R06.09 DOE (DYSPNEA ON EXERTION): ICD-10-CM

## 2024-04-25 DIAGNOSIS — R09.89 CAROTID BRUIT, UNSPECIFIED LATERALITY: ICD-10-CM

## 2024-04-25 DIAGNOSIS — R94.31 ABNORMAL EKG: ICD-10-CM

## 2024-04-25 DIAGNOSIS — R01.1 SYSTOLIC MURMUR: ICD-10-CM

## 2024-04-25 LAB
LEFT ARM DIASTOLIC BLOOD PRESSURE: 82 MMHG
LEFT ARM SYSTOLIC BLOOD PRESSURE: 161 MMHG
LEFT CBA DIAS: 22 CM/S
LEFT CBA SYS: 57 CM/S
LEFT CCA DIST DIAS: 22 CM/S
LEFT CCA DIST SYS: 78 CM/S
LEFT CCA MID DIAS: 16 CM/S
LEFT CCA MID SYS: 90 CM/S
LEFT CCA PROX DIAS: 24 CM/S
LEFT CCA PROX SYS: 99 CM/S
LEFT ECA DIAS: 12 CM/S
LEFT ECA SYS: 92 CM/S
LEFT ICA DIST DIAS: 29 CM/S
LEFT ICA DIST SYS: 66 CM/S
LEFT ICA MID DIAS: 16 CM/S
LEFT ICA MID SYS: 62 CM/S
LEFT ICA PROX DIAS: 22 CM/S
LEFT ICA PROX SYS: 59 CM/S
LEFT VERTEBRAL DIAS: 9 CM/S
LEFT VERTEBRAL SYS: 51 CM/S
OHS CV CAROTID RIGHT ICA EDV HIGHEST: 24
OHS CV CAROTID ULTRASOUND LEFT ICA/CCA RATIO: 0.85
OHS CV CAROTID ULTRASOUND RIGHT ICA/CCA RATIO: 1.08
OHS CV PV CAROTID LEFT HIGHEST CCA: 99
OHS CV PV CAROTID LEFT HIGHEST ICA: 66
OHS CV PV CAROTID RIGHT HIGHEST CCA: 129
OHS CV PV CAROTID RIGHT HIGHEST ICA: 95
OHS CV US CAROTID LEFT HIGHEST EDV: 29
RIGHT ARM DIASTOLIC BLOOD PRESSURE: 82 MMHG
RIGHT ARM SYSTOLIC BLOOD PRESSURE: 161 MMHG
RIGHT CBA DIAS: 14 CM/S
RIGHT CBA SYS: 59 CM/S
RIGHT CCA DIST DIAS: 20 CM/S
RIGHT CCA DIST SYS: 88 CM/S
RIGHT CCA MID DIAS: 25 CM/S
RIGHT CCA MID SYS: 129 CM/S
RIGHT CCA PROX DIAS: 21 CM/S
RIGHT CCA PROX SYS: 124 CM/S
RIGHT ECA DIAS: 12 CM/S
RIGHT ECA SYS: 105 CM/S
RIGHT ICA DIST DIAS: 22 CM/S
RIGHT ICA DIST SYS: 58 CM/S
RIGHT ICA MID DIAS: 21 CM/S
RIGHT ICA MID SYS: 70 CM/S
RIGHT ICA PROX DIAS: 24 CM/S
RIGHT ICA PROX SYS: 95 CM/S
RIGHT VERTEBRAL DIAS: 20 CM/S
RIGHT VERTEBRAL SYS: 62 CM/S

## 2024-04-25 PROCEDURE — 93306 TTE W/DOPPLER COMPLETE: CPT | Mod: PO

## 2024-04-25 PROCEDURE — 93880 EXTRACRANIAL BILAT STUDY: CPT | Mod: 26,,, | Performed by: INTERNAL MEDICINE

## 2024-04-25 PROCEDURE — 93880 EXTRACRANIAL BILAT STUDY: CPT | Mod: PO

## 2024-04-25 PROCEDURE — 78452 HT MUSCLE IMAGE SPECT MULT: CPT | Mod: PO

## 2024-04-25 PROCEDURE — 93017 CV STRESS TEST TRACING ONLY: CPT | Mod: PO

## 2024-04-25 PROCEDURE — 93018 CV STRESS TEST I&R ONLY: CPT | Mod: ,,, | Performed by: INTERNAL MEDICINE

## 2024-04-25 PROCEDURE — 93306 TTE W/DOPPLER COMPLETE: CPT | Mod: 26,,, | Performed by: INTERNAL MEDICINE

## 2024-04-25 PROCEDURE — 93016 CV STRESS TEST SUPVJ ONLY: CPT | Mod: ,,, | Performed by: INTERNAL MEDICINE

## 2024-04-25 PROCEDURE — 78452 HT MUSCLE IMAGE SPECT MULT: CPT | Mod: 26,,, | Performed by: INTERNAL MEDICINE

## 2024-04-25 PROCEDURE — A9502 TC99M TETROFOSMIN: HCPCS | Mod: PO | Performed by: INTERNAL MEDICINE

## 2024-04-25 RX ADMIN — TETROFOSMIN 10.5 MILLICURIE: 1.38 INJECTION, POWDER, LYOPHILIZED, FOR SOLUTION INTRAVENOUS at 09:04

## 2024-04-25 RX ADMIN — TETROFOSMIN 31.9 MILLICURIE: 1.38 INJECTION, POWDER, LYOPHILIZED, FOR SOLUTION INTRAVENOUS at 09:04

## 2024-04-26 ENCOUNTER — TELEPHONE (OUTPATIENT)
Dept: FAMILY MEDICINE | Facility: CLINIC | Age: 79
End: 2024-04-26
Payer: MEDICARE

## 2024-04-26 VITALS — SYSTOLIC BLOOD PRESSURE: 132 MMHG | DIASTOLIC BLOOD PRESSURE: 74 MMHG

## 2024-04-26 LAB
ASCENDING AORTA: 3.9 CM
AV INDEX (PROSTH): 0.83
AV MEAN GRADIENT: 8 MMHG
AV PEAK GRADIENT: 14 MMHG
AV REGURGITATION PRESSURE HALF TIME: 472.5 MS
AV VALVE AREA BY VELOCITY RATIO: 2.46 CM²
AV VALVE AREA: 2.75 CM²
AV VELOCITY RATIO: 0.74
BSA FOR ECHO PROCEDURE: 2.07 M2
CV ECHO LV RWT: 0.55 CM
CV STRESS BASE HR: 80 BPM
DIASTOLIC BLOOD PRESSURE: 119 MMHG
DOP CALC AO PEAK VEL: 1.88 M/S
DOP CALC AO VTI: 34.1 CM
DOP CALC LVOT AREA: 3.3 CM2
DOP CALC LVOT DIAMETER: 2.05 CM
DOP CALC LVOT PEAK VEL: 1.4 M/S
DOP CALC LVOT STROKE VOLUME: 93.69 CM3
DOP CALCLVOT PEAK VEL VTI: 28.4 CM
E WAVE DECELERATION TIME: 159.65 MSEC
E/A RATIO: 0.91
E/E' RATIO: 7.53 M/S
ECHO LV POSTERIOR WALL: 1.09 CM (ref 0.6–1.1)
EJECTION FRACTION: 65 %
FRACTIONAL SHORTENING: 34 % (ref 28–44)
INTERVENTRICULAR SEPTUM: 1.17 CM (ref 0.6–1.1)
LEFT ATRIUM SIZE: 3.26 CM
LEFT ATRIUM VOLUME INDEX MOD: 20.2 ML/M2
LEFT ATRIUM VOLUME MOD: 41.75 CM3
LEFT INTERNAL DIMENSION IN SYSTOLE: 2.62 CM (ref 2.1–4)
LEFT VENTRICLE DIASTOLIC VOLUME INDEX: 32.74 ML/M2
LEFT VENTRICLE DIASTOLIC VOLUME: 67.78 ML
LEFT VENTRICLE MASS INDEX: 72 G/M2
LEFT VENTRICLE SYSTOLIC VOLUME INDEX: 12.1 ML/M2
LEFT VENTRICLE SYSTOLIC VOLUME: 24.96 ML
LEFT VENTRICULAR INTERNAL DIMENSION IN DIASTOLE: 3.95 CM (ref 3.5–6)
LEFT VENTRICULAR MASS: 148.57 G
LV LATERAL E/E' RATIO: 5.82 M/S
LV SEPTAL E/E' RATIO: 10.67 M/S
LVOT MG: 4.6 MMHG
LVOT MV: 1.03 CM/S
MV PEAK A VEL: 0.7 M/S
MV PEAK E VEL: 0.64 M/S
MV STENOSIS PRESSURE HALF TIME: 46.3 MS
MV VALVE AREA P 1/2 METHOD: 4.75 CM2
NUC REST EJECTION FRACTION: 74
OHS CV CPX 1 MINUTE RECOVERY HEART RATE: 121 BPM
OHS CV CPX 85 PERCENT MAX PREDICTED HEART RATE MALE: 122
OHS CV CPX ESTIMATED METS: 9
OHS CV CPX MAX PREDICTED HEART RATE: 144
OHS CV CPX PATIENT IS FEMALE: 0
OHS CV CPX PATIENT IS MALE: 1
OHS CV CPX PEAK DIASTOLIC BLOOD PRESSURE: 110 MMHG
OHS CV CPX PEAK HEAR RATE: 160 BPM
OHS CV CPX PEAK RATE PRESSURE PRODUCT: NORMAL
OHS CV CPX PEAK SYSTOLIC BLOOD PRESSURE: 262 MMHG
OHS CV CPX PERCENT MAX PREDICTED HEART RATE ACHIEVED: 111
OHS CV CPX RATE PRESSURE PRODUCT PRESENTING: NORMAL
OHS CV RV/LV RATIO: 0.84 CM
PISA AR MAX VEL: 4.57 M/S
PISA TR MAX VEL: 2.5 M/S
PULM VEIN S/D RATIO: 1.07
PV PEAK D VEL: 0.58 M/S
PV PEAK S VEL: 0.62 M/S
RA PRESSURE ESTIMATED: 3 MMHG
RA VOL SYS: 39.63 ML
RIGHT ATRIAL AREA: 15.2 CM2
RIGHT VENTRICULAR END-DIASTOLIC DIMENSION: 3.31 CM
RIGHT VENTRICULAR LENGTH IN DIASTOLE (APICAL 4-CHAMBER VIEW): 7.2 CM
RV MID DIAMA: 2.49 CM
RV TB RVSP: 6 MMHG
RV TISSUE DOPPLER FREE WALL SYSTOLIC VELOCITY 1 (APICAL 4 CHAMBER VIEW): 14.73 CM/S
SINUS: 3.41 CM
STJ: 2.95 CM
STRESS ECHO POST EXERCISE DUR MIN: 5 MINUTES
STRESS ECHO POST EXERCISE DUR SEC: 48 SECONDS
SYSTOLIC BLOOD PRESSURE: 202 MMHG
TDI LATERAL: 0.11 M/S
TDI SEPTAL: 0.06 M/S
TDI: 0.09 M/S
TR MAX PG: 25 MMHG
TRICUSPID ANNULAR PLANE SYSTOLIC EXCURSION: 2.93 CM
TV REST PULMONARY ARTERY PRESSURE: 28 MMHG
Z-SCORE OF LEFT VENTRICULAR DIMENSION IN END DIASTOLE: -4.66
Z-SCORE OF LEFT VENTRICULAR DIMENSION IN END SYSTOLE: -3.05

## 2024-04-26 NOTE — TELEPHONE ENCOUNTER
Pt stated his bp was high during the stress test, however after the test was finished, bp came down to 132/74. Updated bp in chart. Stated he feels fine.

## 2024-04-26 NOTE — TELEPHONE ENCOUNTER
----- Message from Elias Rowan MD sent at 4/25/2024  1:35 PM CDT -----  Regarding: Blood pressure reading  Please update home blood pressure reading for patient's chart.  Check on the patient to see how he is feeling.  He had a stress test 4/25..

## 2024-04-30 ENCOUNTER — PATIENT MESSAGE (OUTPATIENT)
Dept: PAIN MEDICINE | Facility: CLINIC | Age: 79
End: 2024-04-30
Payer: MEDICARE

## 2024-05-18 ENCOUNTER — PATIENT MESSAGE (OUTPATIENT)
Dept: FAMILY MEDICINE | Facility: CLINIC | Age: 79
End: 2024-05-18
Payer: MEDICARE

## 2024-06-24 RX ORDER — OLMESARTAN MEDOXOMIL 20 MG/1
TABLET ORAL
Qty: 90 TABLET | Refills: 1 | Status: SHIPPED | OUTPATIENT
Start: 2024-06-24

## 2024-07-11 RX ORDER — DOXYCYCLINE 100 MG/1
100 CAPSULE ORAL EVERY 12 HOURS
Qty: 60 CAPSULE | Refills: 11 | Status: SHIPPED | OUTPATIENT
Start: 2024-07-11

## 2024-07-14 NOTE — PROGRESS NOTES
Section Procedure Note    Indications:  PPROM, prior LTCS x2, Breech presentation    Pre-operative Diagnosis: 34 week 0 day pregnancy.    Post-operative Diagnosis: same    Surgeon: Beverley Diane MD    Assistant(s): Dr. Stewart assisted thought the entire case .I certify that her involvement was medically necessary. Her involvement includes but is not limited to assistance with exposure, instrument exchange, achievement of hemostasis, opening and closing, dissecting tissue and removing tissue. I do not involve residents in my procedures.     Procedure Details   The patient was seen in the Holding Room. The risks, benefits, complications, treatment options, and expected outcomes were discussed with the patient.  The patient concurred with the proposed plan, giving informed consent. Time Out was held and the above information confirmed.    After induction of anesthesia, a traxi was applied, and the patient was draped and prepped in the usual sterile manner. A Pfannenstiel incision was made and carried down through the subcutaneous tissue to the fascia. Fascial incision was made and extended transversely with dykes scissors. The peritoneum was identified and entered bluntly. Peritoneal opening was extended laterally with a lateral pull. An Beau retractor was placed. The lower uterine segment was identified and a low transverse uterine incision was made. Delivered from tavares breech presentation with the normal breech maneuvers, was a 2365 gram term, male infant, with Apgar scores of 8 at one minute and 9 at five minutes. A loose nuchal cord was noted. After a one minute delay, the umbilical cord was clamped and cut and cord blood was obtained for evaluation. The placenta was removed with cord traction and fundal massage. It was intact and appeared normal. The uterine outline, tubes and ovaries appeared normal. The uterine incision was closed with running locked sutures of 0 Monocryl and imbricated with the same  Primary Care Telemedicine Note  The patient location is:  Patient's Home - [place]  The chief complaint leading to consultation is:   Chief Complaint   Patient presents with    Medication Refill      Total time:  see MDM below. The total time spent on the encounter, which includes face to face time and non-face to face time preparing to see the patient (eg, review of previous medical records, tests), Obtaining and/or reviewing separately obtained history, documenting clinical information in the electronic or other health record, independently interpreting results (not separately reported)/communicating results to the patient/family/caregiver, and/or care coordination (not separately reported).    Visit type: Virtual visit with synchronous audio only and video  Each patient to whom he or she provides medical services by telemedicine is:  (1) informed of the relationship between the physician and patient and the respective role of any other health care provider with respect to management of the patient; and (2) notified that he or she may decline to receive medical services by telemedicine and may withdraw from such care at any time.  =================================================================  PLAN:      Problem List Items Addressed This Visit       Encounter for long-term (current) use of medications (Chronic)     Complete history and limited telemedicine physical was completed today.  Complete and thorough medication reconciliation was performed.  Discussed risks and benefits of medications.  Advised patient on orders and health maintenance.  We discussed old records and old labs if available.  Will request any records not available through epic.  Continue current medications listed on your summary sheet.           Hypertension, essential - Primary (Chronic)     Avoid ACE inhibitor.  Counseled on importance of hypertension disease course, I recommend ongoing Education for DASH-diet and exercise.  Counseled  in a horizontal fashion. Hemostasis was observed. The muscles were inspected and any bleeding controlled with Bovie cautery. The fascia was then reapproximated with looped 0-PDS in a running stitch. Irrigation was used and any hemostasis was noted after the use of Bovie cautery. The subcutaneous tissue was reapproximated with 2-0 Vicryl. The skin was reapproximated with 4-0 Monocryl and covered with a Prevena negative pressure dressing.    The patient was the frog-legged and a bimanual exam was done with a sterile glove. The cerclage knot was manually located and pulled to descend to the vaginal introitus. The knot was cut with dykes scissors and the cerclage was removed from the cervix and discarded.    Instrument, sponge, and needle counts were correct prior to the abdominal closure and at the conclusion of the case. Antibiotics were given prior to incision.    Findings:  As above    Calculated Blood Loss:  141 mL           Drains: William, Prevena           Total IV Fluids: see anesthesia record           Specimens: Placenta, cord gasses           Implants: none           Complications:  None; patient tolerated the procedure well.           Disposition: PACU - hemodynamically stable.           Condition: stable    Attending Attestation: I performed the procedure.             on medication regimen importance of treating high blood pressure.  Please be advised of risk of untreated blood pressure as discussed.  Please advised of ER precautions were given for symptoms of hypertensive urgency and emergency.           Motion sickness     Refill Transderm-Scop.  Motion sickness precautions.         Relevant Medications    scopolamine (TRANSDERM-SCOP) 1.3-1.5 mg (1 mg over 3 days)    Bacterial infection     Refill doxycycline.  Discussed condition course and signs and symptoms to expect.  Patient advised take anti-inflammatories and or Tylenol for pain or fever.  ER precautions.  Call MD or follow-up to clinic if not improving or worsening symptoms.               Medication Management for assessment above:   Medication List with Changes/Refills   New Medications    SCOPOLAMINE (TRANSDERM-SCOP) 1.3-1.5 MG (1 MG OVER 3 DAYS)    Place 1 patch onto the skin every 72 hours.   Current Medications    ASCORBIC ACID, VITAMIN C, (VITAMIN C) 500 MG TABLET    Take 500 mg by mouth once daily.    ASPIRIN (ECOTRIN) 81 MG EC TABLET    Take 1 tablet (81 mg total) by mouth once daily.    ATORVASTATIN (LIPITOR) 20 MG TABLET    Take 1 tablet (20 mg total) by mouth once daily.    FISH OIL-OMEGA-3 FATTY ACIDS 300-1,000 MG CAPSULE    Take by mouth once daily.    FOLIC ACID (FOLVITE) 1 MG TABLET    Take 1 mg by mouth once daily.    SAW PALMETTO FRUIT 450 MG CAP    Take 4 capsules by mouth.    TAMSULOSIN (FLOMAX) 0.4 MG CAP    Take 1 capsule (0.4 mg total) by mouth once daily.    VALACYCLOVIR (VALTREX) 1000 MG TABLET    Take 1 tablet (1,000 mg total) by mouth 2 (two) times daily.    ZINC GLUCONATE 50 MG TABLET    Take 50 mg by mouth once daily.   Changed and/or Refilled Medications    Modified Medication Previous Medication    DOXYCYCLINE (VIBRAMYCIN) 100 MG CAP doxycycline (VIBRAMYCIN) 100 MG Cap       Take 1 capsule (100 mg total) by mouth 2 (two) times daily.    Take 100 mg by mouth 2 (two) times daily.    Discontinued Medications    LISINOPRIL (PRINIVIL,ZESTRIL) 20 MG TABLET    TAKE 1 TABLET DAILY FOR BLOOD PRESSURE *THANK YOU*       Elias Rowan M.D.  ==========================================================================  Subjective:   Patient ID: Trell Rios is a 77 y.o. male.  has a past medical history of Encounter for long-term (current) use of medications (10/17/2019), Hyperlipidemia (10/17/2019), and Hypertension.   Chief Complaint: Medication Refill      Problem List Items Addressed This Visit       Encounter for long-term (current) use of medications (Chronic)    Overview     December 2022: Reviewed labs.  CHRONIC long-term drug therapy for managed conditions. See medication list. Reports compliance.  No side effects reported.  Routine lab work is being monitored.  Patient does need refills today.   November 2021:CHRONIC. Stable. Compliant with medications for managed conditions. See medication list. No SE reported.   Routine lab analysis is being monitored. Refills were addressed.  Lab Results   Component Value Date    WBC 9.4 08/11/2022    HGB 13.7 08/11/2022    HCT 39.0 08/11/2022    MCV 96.5 08/11/2022     08/11/2022       Chemistry        Component Value Date/Time     08/11/2022 1340    K 3.9 08/11/2022 1340     08/11/2022 1340    CO2 29 08/11/2022 1340    BUN 23 08/11/2022 1340    CREATININE 1.13 08/11/2022 1340    GLU 99 08/11/2022 1340        Component Value Date/Time    CALCIUM 9.5 08/11/2022 1340    ALKPHOS 99 10/17/2019 1200    AST 44 (H) 08/11/2022 1340    ALT 49 (H) 08/11/2022 1340    BILITOT 0.6 08/11/2022 1340    ESTGFRAFRICA 98 12/31/2020 0759    EGFRNONAA 84 12/31/2020 0759          Lab Results   Component Value Date    TSH 0.80 08/11/2022            Current Assessment & Plan     Complete history and limited telemedicine physical was completed today.  Complete and thorough medication reconciliation was performed.  Discussed risks and benefits of medications.   Advised patient on orders and health maintenance.  We discussed old records and old labs if available.  Will request any records not available through epic.  Continue current medications listed on your summary sheet.           Hypertension, essential - Primary (Chronic)    Overview     December 2022: Patient reports that he had a dry hacking cough for several months.  Patient held lisinopril and the cough went away.  Patient's blood pressure remains less than 140/90 off of the medication.  December 2020:  Patient reports blood pressure log has been averaging 130/80.  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  Blood pressure cuff has been validated.  November 2021:  Reports blood pressure well controlled  CHRONIC. STABLE. BP Reviewed.  Compliant with BP medications. No SE reported.   (-) CP, SOB, palpitations, dizziness, lightheadedness, HA, arm numbness, tingling or weakness, syncope.  Creatinine   Date Value Ref Range Status   08/11/2022 1.13 0.70 - 1.28 mg/dL Final   No results found for this or any previous visit.           Current Assessment & Plan     Avoid ACE inhibitor.  Counseled on importance of hypertension disease course, I recommend ongoing Education for DASH-diet and exercise.  Counseled on medication regimen importance of treating high blood pressure.  Please be advised of risk of untreated blood pressure as discussed.  Please advised of ER precautions were given for symptoms of hypertensive urgency and emergency.           Motion sickness    Overview     Chronic.  Recurrent.  Patient states that he gets motion sickness when going on a cruise.  Patient will be going on a cruise soon and requesting refill of Transderm-Scop.         Current Assessment & Plan     Refill Transderm-Scop.  Motion sickness precautions.         Bacterial infection    Overview     Chronic.  Recurrent.  Patient gets bacterial infection of the skin that is treated with doxycycline.  Patient  requesting refill.         Current Assessment & Plan     Refill doxycycline.  Discussed condition course and signs and symptoms to expect.  Patient advised take anti-inflammatories and or Tylenol for pain or fever.  ER precautions.  Call MD or follow-up to clinic if not improving or worsening symptoms.               Review of patient's allergies indicates:   Allergen Reactions    Lisinopril     Sulfa (sulfonamide antibiotics) Rash     Current Outpatient Medications   Medication Instructions    ascorbic acid (vitamin C) (VITAMIN C) 500 mg, Oral, Daily    aspirin (ECOTRIN) 81 mg, Oral, Daily    atorvastatin (LIPITOR) 20 mg, Oral, Daily    doxycycline (VIBRAMYCIN) 100 mg, Oral, 2 times daily    fish oil-omega-3 fatty acids 300-1,000 mg capsule Oral, Daily    folic acid (FOLVITE) 1 mg, Oral, Daily    saw palmetto fruit 450 mg Cap 4 capsules, Oral    scopolamine (TRANSDERM-SCOP) 1.3-1.5 mg (1 mg over 3 days) 1 patch, Transdermal, Every 72 hours    tamsulosin (FLOMAX) 0.4 mg, Oral, Daily    valACYclovir (VALTREX) 1,000 mg, Oral, 2 times daily    zinc gluconate 50 mg, Oral, Daily      I have reviewed the PMH, social history, FamilyHx, surgical history, allergies and medications documented / confirmed by the patient at the time of this visit.  Review of Systems   Constitutional:  Negative for activity change and unexpected weight change.   HENT:  Negative for hearing loss, rhinorrhea and trouble swallowing.    Eyes:  Negative for discharge and visual disturbance.   Respiratory:  Negative for chest tightness and wheezing.    Cardiovascular:  Negative for chest pain and palpitations.   Gastrointestinal:  Negative for blood in stool, constipation, diarrhea and vomiting.   Endocrine: Negative for polydipsia and polyuria.   Genitourinary:  Negative for difficulty urinating, hematuria and urgency.   Musculoskeletal:  Negative for arthralgias, joint swelling and neck pain.   Neurological:  Negative for weakness and headaches.    Psychiatric/Behavioral:  Negative for confusion and dysphoric mood.    Objective:   /87   Physical Exam  Constitutional:       General: He is not in acute distress.     Appearance: He is well-developed. He is not diaphoretic.   HENT:      Head: Normocephalic and atraumatic.   Eyes:      Extraocular Movements: Extraocular movements intact.      Pupils: Pupils are equal, round, and reactive to light.   Pulmonary:      Effort: Pulmonary effort is normal.   Musculoskeletal:         General: Normal range of motion.      Cervical back: Normal range of motion.   Skin:     Findings: No rash.   Neurological:      Mental Status: He is alert and oriented to person, place, and time.   Psychiatric:         Attention and Perception: He is attentive.         Mood and Affect: Mood normal. Mood is not anxious or depressed. Affect is not labile, blunt, angry or inappropriate.         Speech: He is communicative. Speech is not rapid and pressured, delayed, slurred or tangential.         Behavior: Behavior normal. Behavior is not agitated, slowed, aggressive, withdrawn, hyperactive or combative.         Thought Content: Thought content normal. Thought content is not paranoid or delusional. Thought content does not include homicidal or suicidal ideation. Thought content does not include homicidal or suicidal plan.         Cognition and Memory: Memory is not impaired.         Judgment: Judgment normal. Judgment is not impulsive or inappropriate.       Assessment:     1. Hypertension, essential    2. Encounter for long-term (current) use of medications    3. Motion sickness, subsequent encounter    4. Bacterial infection      MDM:     Total time: 31 minutes.  This includes total time spent on the encounter, which includes face to face time and non-face to face time preparing to see the patient (eg, review of previous medical records, tests), Obtaining and/or reviewing separately obtained history, documenting clinical information  in the electronic or other health record, independently interpreting results (not separately reported)/communicating results to the patient/family/caregiver, and/or care coordination (not separately reported).    I have Reviewed and summarized old records.  I have performed thorough medication reconciliation today and discussed risk and benefits of medications.  I have reviewed labs and discussed with patient.  All questions were answered.      I have signed for the following orders AND/OR meds.  No orders of the defined types were placed in this encounter.    Medications Ordered This Encounter   Medications    doxycycline (VIBRAMYCIN) 100 MG Cap     Sig: Take 1 capsule (100 mg total) by mouth 2 (two) times daily.     Dispense:  60 capsule     Refill:  5    scopolamine (TRANSDERM-SCOP) 1.3-1.5 mg (1 mg over 3 days)     Sig: Place 1 patch onto the skin every 72 hours.     Dispense:  8 patch     Refill:  0        Follow up if symptoms worsen or fail to improve.    If no improvement in symptoms or symptoms worsen, advised to call/follow-up at clinic or go to ER. Patient voiced understanding and all questions/concerns were addressed.   DISCLAIMER: This note was compiled by using a speech recognition dictation system and therefore please be aware that typographical / speech recognition errors can and do occur.  Please contact me if you see any errors specifically.    Elias Rowan M.D.       Office: [phone] [id number] [place] [address]  FAX: [phone]

## 2024-09-11 ENCOUNTER — PATIENT MESSAGE (OUTPATIENT)
Dept: FAMILY MEDICINE | Facility: CLINIC | Age: 79
End: 2024-09-11
Payer: MEDICARE

## 2024-11-12 RX ORDER — KETOCONAZOLE 20 MG/G
CREAM TOPICAL 2 TIMES DAILY
Qty: 60 G | Refills: 1 | Status: SHIPPED | OUTPATIENT
Start: 2024-11-12

## 2024-11-17 DIAGNOSIS — I10 HYPERTENSION, UNSPECIFIED TYPE: Primary | ICD-10-CM

## 2024-11-17 DIAGNOSIS — A49.9 BACTERIAL INFECTION: ICD-10-CM

## 2024-11-17 RX ORDER — AMOXICILLIN AND CLAVULANATE POTASSIUM 875; 125 MG/1; MG/1
1 TABLET, FILM COATED ORAL 2 TIMES DAILY
Qty: 20 TABLET | Refills: 1 | Status: SHIPPED | OUTPATIENT
Start: 2024-11-17

## 2024-11-17 RX ORDER — OLMESARTAN MEDOXOMIL 20 MG/1
20 TABLET ORAL DAILY
Qty: 90 TABLET | Refills: 4 | Status: SHIPPED | OUTPATIENT
Start: 2024-11-17

## 2024-12-12 ENCOUNTER — TELEPHONE (OUTPATIENT)
Dept: CARDIOLOGY | Facility: CLINIC | Age: 79
End: 2024-12-12

## 2024-12-12 ENCOUNTER — OFFICE VISIT (OUTPATIENT)
Dept: CARDIOLOGY | Facility: CLINIC | Age: 79
End: 2024-12-12
Payer: MEDICARE

## 2024-12-12 VITALS
WEIGHT: 193.13 LBS | HEART RATE: 80 BPM | SYSTOLIC BLOOD PRESSURE: 138 MMHG | DIASTOLIC BLOOD PRESSURE: 78 MMHG | BODY MASS INDEX: 26.16 KG/M2 | HEIGHT: 72 IN

## 2024-12-12 DIAGNOSIS — I10 HYPERTENSION, ESSENTIAL: Chronic | ICD-10-CM

## 2024-12-12 DIAGNOSIS — I51.7 LAE (LEFT ATRIAL ENLARGEMENT): ICD-10-CM

## 2024-12-12 DIAGNOSIS — I77.810 ASCENDING AORTA DILATION: Primary | ICD-10-CM

## 2024-12-12 DIAGNOSIS — I34.0 NONRHEUMATIC MITRAL VALVE REGURGITATION: ICD-10-CM

## 2024-12-12 DIAGNOSIS — E78.49 OTHER HYPERLIPIDEMIA: Chronic | ICD-10-CM

## 2024-12-12 PROCEDURE — 3288F FALL RISK ASSESSMENT DOCD: CPT | Mod: CPTII,S$GLB,, | Performed by: INTERNAL MEDICINE

## 2024-12-12 PROCEDURE — 3078F DIAST BP <80 MM HG: CPT | Mod: CPTII,S$GLB,, | Performed by: INTERNAL MEDICINE

## 2024-12-12 PROCEDURE — 1101F PT FALLS ASSESS-DOCD LE1/YR: CPT | Mod: CPTII,S$GLB,, | Performed by: INTERNAL MEDICINE

## 2024-12-12 PROCEDURE — 3075F SYST BP GE 130 - 139MM HG: CPT | Mod: CPTII,S$GLB,, | Performed by: INTERNAL MEDICINE

## 2024-12-12 PROCEDURE — 99214 OFFICE O/P EST MOD 30 MIN: CPT | Mod: S$GLB,,, | Performed by: INTERNAL MEDICINE

## 2024-12-12 PROCEDURE — 99999 PR PBB SHADOW E&M-EST. PATIENT-LVL III: CPT | Mod: PBBFAC,,, | Performed by: INTERNAL MEDICINE

## 2024-12-12 PROCEDURE — 1126F AMNT PAIN NOTED NONE PRSNT: CPT | Mod: CPTII,S$GLB,, | Performed by: INTERNAL MEDICINE

## 2024-12-12 RX ORDER — METOPROLOL SUCCINATE 25 MG/1
12.5 TABLET, EXTENDED RELEASE ORAL DAILY
Qty: 45 TABLET | Refills: 1 | Status: SHIPPED | OUTPATIENT
Start: 2024-12-12 | End: 2025-12-12

## 2024-12-12 NOTE — PROGRESS NOTES
Subjective:    Patient ID:  Trell Rios is a 79 y.o. male who presents for Follow-up and Heart Problem        HPI  DISCUSSED TEST NORMAL NUCLEAR STRESS TEST, NORMAL CAROTIDS, ECHOCARDIOGRAM NORMAL LV FUNCTION, MILDLY DILATED LEFT ATRIUM MILD MITRAL REGURGITATION MILDLY TO MODERATELY DILATED ASCENDING AORTA      Left Ventricle: The left ventricle is normal in size.  There is concentric remodeling. There is normal systolic function. Ejection fraction by visual approximation is 65 -70 %. There is normal diastolic function.    Right Ventricle: Normal right ventricular cavity size. Systolic function is normal.    Left Atrium: Left atrium is mildly dilated.    Aortic Valve:There is mild aortic valve sclerosis.  No stenosis    Mitral Valve:There is mild regurgitation.    Tricuspid Valve: There is mild regurgitation.    Pulmonary Artery: The estimated pulmonary artery systolic pressure is 28 mmHg.    IVC/SVC: Normal venous pressure at 3 mmHg.    Mildly to moderately dilated ascending aorta.  Past Medical History:   Diagnosis Date    Encounter for long-term (current) use of medications 10/17/2019     Patient is on CHRONIC long-term drug therapy for managed conditions. See medication list. Reports compliance.  No side effects reported.  Routine lab work is being monitored.  Patient does need refills today.   Lab Results Component Value Date  WBC 4.8 10/17/2019  HGB 15.7 10/17/2019  HCT 45.3 10/17/2019  MCV 96.6 10/17/2019   10/17/2019     Chemistry    Component Value Date/Time   1    Hyperlipidemia 10/17/2019     This condition is chronic.  Currently stable.  Patient reports compliance with hyperlipidemia treatment as prescribed.  No side effects reported at this time.  Lab Results Component Value Date  CHOL 118 10/17/2019  Lab Results Component Value Date  HDL 54 10/17/2019  Lab Results Component Value Date  LDLCALC 41 10/17/2019  Lab Results Component Value Date  TRIG 145 10/17/2019  Lab Results Compone     Hypertension      Past Surgical History:   Procedure Laterality Date    HERNIA REPAIR       Family History   Problem Relation Name Age of Onset    Cancer Father Andi      Social History     Socioeconomic History    Marital status:    Tobacco Use    Smoking status: Never    Smokeless tobacco: Never   Substance and Sexual Activity    Alcohol use: Never    Drug use: Never    Sexual activity: Yes     Partners: Female     Birth control/protection: Post-menopausal     Social Drivers of Health     Financial Resource Strain: Low Risk  (2/6/2024)    Overall Financial Resource Strain (CARDIA)     Difficulty of Paying Living Expenses: Not hard at all   Food Insecurity: No Food Insecurity (2/6/2024)    Hunger Vital Sign     Worried About Running Out of Food in the Last Year: Never true     Ran Out of Food in the Last Year: Never true   Transportation Needs: No Transportation Needs (2/6/2024)    PRAPARE - Transportation     Lack of Transportation (Medical): No     Lack of Transportation (Non-Medical): No   Physical Activity: Unknown (2/6/2024)    Exercise Vital Sign     Days of Exercise per Week: Patient declined   Stress: No Stress Concern Present (2/6/2024)    [employer] - Occupational Stress Questionnaire     Feeling of Stress : Not at all   Housing Stability: Unknown (2/6/2024)    Housing Stability Vital Sign     Unable to Pay for Housing in the Last Year: No     Unstable Housing in the Last Year: No       Review of patient's allergies indicates:   Allergen Reactions    Lisinopril     Sulfa (sulfonamide antibiotics) Rash       Current Outpatient Medications:     amLODIPine (NORVASC) 5 MG tablet, Take 1 tablet (5 mg total) by mouth once daily., Disp: 30 tablet, Rfl: 11    ascorbic acid, vitamin C, (VITAMIN C) 500 MG tablet, Take 500 mg by mouth once daily., Disp: , Rfl:     aspirin (ECOTRIN) 81 MG EC tablet, Take 1 tablet (81 mg total) by mouth once daily., Disp: 90 tablet, Rfl: 11     atorvastatin (LIPITOR) 20 MG tablet, Take 1 tablet (20 mg total) by mouth once daily., Disp: 90 tablet, Rfl: 4    diclofenac sodium (VOLTAREN) 1 % Gel, Apply 2 g topically once daily., Disp: 100 g, Rfl: 0    fish oil-omega-3 fatty acids 300-1,000 mg capsule, Take by mouth once daily., Disp: , Rfl:     folic acid (FOLVITE) 1 MG tablet, Take 1 mg by mouth once daily., Disp: , Rfl:     olmesartan (BENICAR) 20 MG tablet, Take 1 tablet (20 mg total) by mouth once daily., Disp: 90 tablet, Rfl: 4    saw palmetto fruit 450 mg Cap, Take 4 capsules by mouth., Disp: , Rfl:     scopolamine (TRANSDERM-SCOP) 1.3-1.5 mg (1 mg over 3 days), Place 1 patch onto the skin every 72 hours., Disp: 8 patch, Rfl: 0    valACYclovir (VALTREX) 1000 MG tablet, Take 1 tablet (1,000 mg total) by mouth 2 (two) times daily. (Patient taking differently: Take 1,000 mg by mouth as needed.), Disp: 60 tablet, Rfl: 11    vitamin D (VITAMIN D3) 1000 units Tab, Take 1,000 Units by mouth once daily., Disp: , Rfl:     zinc gluconate 50 mg tablet, Take 50 mg by mouth once daily., Disp: , Rfl:     metoprolol succinate (TOPROL-XL) 25 MG 24 hr tablet, Take 0.5 tablets (12.5 mg total) by mouth once daily., Disp: 45 tablet, Rfl: 1    Review of Systems   Constitutional: Negative for chills, diaphoresis, fever, malaise/fatigue and night sweats.   HENT:  Negative for congestion and nosebleeds.    Eyes:  Negative for blurred vision, double vision and visual disturbance.   Cardiovascular:  Negative for chest pain, claudication, cyanosis, dyspnea on exertion, irregular heartbeat, leg swelling, near-syncope, orthopnea, palpitations, paroxysmal nocturnal dyspnea and syncope.   Respiratory:  Negative for cough, hemoptysis, shortness of breath and wheezing.    Endocrine: Negative for cold intolerance, heat intolerance and polyuria.   Hematologic/Lymphatic: Negative for adenopathy. Does not bruise/bleed easily.   Skin:  Negative for color change, itching and nail changes.    Musculoskeletal:  Positive for arthritis. Negative for back pain and falls.   Gastrointestinal:  Negative for abdominal pain, change in bowel habit, constipation, dysphagia, jaundice, melena and vomiting.   Genitourinary:  Negative for dysuria and flank pain.   Neurological:  Negative for brief paralysis, dizziness, focal weakness, light-headedness, loss of balance, numbness, paresthesias, tremors and weakness.   Psychiatric/Behavioral:  Negative for altered mental status, depression and memory loss.    Allergic/Immunologic: Negative for hives and persistent infections.        Objective:      Vitals:    12/12/24 1257 12/12/24 1317   BP: (!) 144/80 138/78   Pulse: 80    Weight: 87.6 kg (193 lb 2 oz)    Height: 6' (1.829 m)    PainSc: 0-No pain      Body mass index is 26.19 kg/m².    Physical Exam  HENT:      Head: Normocephalic and atraumatic.   Eyes:      Extraocular Movements: Extraocular movements intact.      Pupils: Pupils are equal, round, and reactive to light.   Cardiovascular:      Rate and Rhythm: Normal rate and regular rhythm.      Pulses:           Carotid pulses are 2+ on the right side and 2+ on the left side.       Radial pulses are 2+ on the right side and 2+ on the left side.        Posterior tibial pulses are 2+ on the right side and 2+ on the left side.      Heart sounds: Murmur heard.      Systolic murmur is present with a grade of 1/6 at the lower left sternal border.      No friction rub. No gallop.   Pulmonary:      Effort: Pulmonary effort is normal.      Breath sounds: Normal breath sounds.   Abdominal:      Palpations: Abdomen is soft.      Tenderness: There is no abdominal tenderness.   Musculoskeletal:      Cervical back: Neck supple.      Right lower leg: No edema.      Left lower leg: No edema.   Skin:     General: Skin is warm and dry.      Capillary Refill: Capillary refill takes less than 2 seconds.   Neurological:      General: No focal deficit present.      Mental Status: He is  alert and oriented to person, place, and time.   Psychiatric:         Mood and Affect: Mood normal.         Behavior: Behavior normal.                 ..    Chemistry        Component Value Date/Time     04/20/2024 0745    K 4.1 04/20/2024 0745     04/20/2024 0745    CO2 26 04/20/2024 0745    BUN 23 04/20/2024 0745    CREATININE 0.98 04/20/2024 0745     (H) 04/20/2024 0745        Component Value Date/Time    CALCIUM 9.5 04/20/2024 0745    ALKPHOS 99 10/17/2019 1200    AST 22 04/20/2024 0745    ALT 28 04/20/2024 0745    BILITOT 0.5 04/20/2024 0745    ESTGFRAFRICA 98 12/31/2020 0759    EGFRNONAA 84 12/31/2020 0759            ..  Lab Results   Component Value Date    CHOL 126 04/20/2024    CHOL 98 08/11/2022    CHOL 135 12/31/2020     Lab Results   Component Value Date    HDL 52 04/20/2024    HDL 54 08/11/2022    HDL 56 12/31/2020     Lab Results   Component Value Date    LDLCALC 54 04/20/2024    LDLCALC 31 08/11/2022    LDLCALC 58 12/31/2020     Lab Results   Component Value Date    TRIG 110 04/20/2024    TRIG 57 08/11/2022    TRIG 126 12/31/2020     Lab Results   Component Value Date    CHOLHDL 2.4 04/20/2024    CHOLHDL 1.8 08/11/2022    CHOLHDL 2.4 12/31/2020     ..  Lab Results   Component Value Date    WBC 4.5 04/20/2024    HGB 13.5 04/20/2024    HCT 40.1 04/20/2024    MCV 96.6 04/20/2024     04/20/2024       Test(s) Reviewed  I have reviewed the following in detail:  [x] Stress test   [] Angiography   [x] Echocardiogram   [] Labs   [x] Other:       Assessment:         ICD-10-CM ICD-9-CM   1. Ascending aorta dilation  I77.810 447.71   2. LAE (left atrial enlargement)  I51.7 429.3   3. Nonrheumatic mitral valve regurgitation  I34.0 424.0   4. Hypertension, essential  I10 401.9   5. Other hyperlipidemia  E78.49 272.4     Problem List Items Addressed This Visit          Cardiac/Vascular    Hypertension, essential (Chronic)    Overview     December 2022: Patient reports that he had a dry  hacking cough for several months.  Patient held lisinopril and the cough went away.  Patient's blood pressure remains less than 140/90 off of the medication.  December 2020:  Patient reports blood pressure log has been averaging 130/80.  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  Blood pressure cuff has been validated.  November 2021:  Reports blood pressure well controlled  CHRONIC. STABLE. BP Reviewed.  Compliant with BP medications. No SE reported.   (-) CP, SOB, palpitations, dizziness, lightheadedness, HA, arm numbness, tingling or weakness, syncope.  Creatinine   Date Value Ref Range Status   08/11/2022 1.13 0.70 - 1.28 mg/dL Final     No results found for this or any previous visit.           Relevant Orders    Comprehensive Metabolic Panel    Magnesium    Hyperlipidemia (Chronic)    Overview       CHRONIC. STABLE. Lab analysis reviewed.   (-) CP, SOB, abdominal pain, N/V/D, constipation, jaundice, skin changes.  (-) Myalgias  Lab Results   Component Value Date    CHOL 135 12/31/2020    CHOL 118 10/17/2019     Lab Results   Component Value Date    HDL 56 12/31/2020    HDL 54 10/17/2019     Lab Results   Component Value Date    LDLCALC 58 12/31/2020    LDLCALC 41 10/17/2019     Lab Results   Component Value Date    TRIG 126 12/31/2020    TRIG 145 10/17/2019     Lab Results   Component Value Date    CHOLHDL 2.4 12/31/2020    CHOLHDL 2.2 10/17/2019     No results found for: TOTALCHOLEST  Lab Results   Component Value Date    ALT 36 12/31/2020    AST 25 12/31/2020    ALKPHOS 99 10/17/2019    BILITOT 0.6 12/31/2020     ======================================================  The 10-year ASCVD risk score ([place] WALTON Jr., et al., 2013) is: 23.2%    Values used to calculate the score:      Age: 76 years      Sex: Male      Is Non- : No      Diabetic: No      Tobacco smoker: No      Systolic Blood Pressure: 130 mmHg      Is BP treated: Yes      HDL Cholesterol:  56 mg/dL      Total Cholesterol: 135 mg/dL             Relevant Orders    Comprehensive Metabolic Panel    Ascending aorta dilation - Primary    Nonrheumatic mitral valve regurgitation    LAE (left atrial enlargement)        Plan:     ADD BB, RTC IN 6 MO WITH LABS, DISCUSSED WITH PT AND WIFE    ALL CV CLINICALLY STABLE, NO ANGINA, NO HF, NO TIA, NO CLINICAL ARRHYTHMIA,CONTINUE CURRENT MEDS, EDUCATION, DIET, EXERCISE         Ascending aorta dilation  Comments:  BB    LAE (left atrial enlargement)    Nonrheumatic mitral valve regurgitation    Hypertension, essential  -     Comprehensive Metabolic Panel; Future; Expected date: 06/12/2025  -     Magnesium; Future; Expected date: 06/12/2025    Other hyperlipidemia  -     Comprehensive Metabolic Panel; Future; Expected date: 06/12/2025    Other orders  -     metoprolol succinate (TOPROL-XL) 25 MG 24 hr tablet; Take 0.5 tablets (12.5 mg total) by mouth once daily.  Dispense: 45 tablet; Refill: 1    RTC Low level/low impact aerobic exercise 5x's/wk. Heart healthy diet and risk factor modification.    See labs and med orders.    Aerobic exercise 5x's/wk. Heart healthy diet and risk factor modification.    See labs and med orders.

## 2025-02-26 DIAGNOSIS — I10 HYPERTENSION, UNSPECIFIED TYPE: ICD-10-CM

## 2025-02-26 DIAGNOSIS — Z79.899 ENCOUNTER FOR LONG-TERM (CURRENT) USE OF MEDICATIONS: ICD-10-CM

## 2025-02-26 DIAGNOSIS — E78.5 HYPERLIPIDEMIA, UNSPECIFIED HYPERLIPIDEMIA TYPE: ICD-10-CM

## 2025-02-26 RX ORDER — AMLODIPINE BESYLATE 5 MG/1
5 TABLET ORAL
Qty: 90 TABLET | Refills: 4 | Status: SHIPPED | OUTPATIENT
Start: 2025-02-26

## 2025-02-26 RX ORDER — ATORVASTATIN CALCIUM 20 MG/1
20 TABLET, FILM COATED ORAL
Qty: 90 TABLET | Refills: 0 | Status: SHIPPED | OUTPATIENT
Start: 2025-02-26

## 2025-02-26 NOTE — TELEPHONE ENCOUNTER
Care Due:                  Date            Visit Type   Department     Provider  --------------------------------------------------------------------------------                                ESTABLISHED   [facility] FAMILY  Last Visit: 02-      PATIENT      MEDICINE       Elias  Harpal  Next Visit: None Scheduled  None         None Found                                                            Last  Test          Frequency    Reason                     Performed    Due Date  --------------------------------------------------------------------------------    Office Visit  12 months..  amLODIPine, atorvastatin,   02- 02-                             diclofenac, olmesartan,                             valACYclovir.............    CBC.........  12 months..  diclofenac, valACYclovir.  04- 04-    CMP.........  12 months..  atorvastatin, diclofenac,   04- 04-                             olmesartan, valACYclovir.    Lipid Panel.  12 months..  atorvastatin.............  04- 04-    [facility] Embedded Care Due Messages. Reference number: [id number].   2/26/2025 8:07:49 AM CST

## 2025-02-26 NOTE — TELEPHONE ENCOUNTER
Refill Routing Note   Medication(s) are not appropriate for processing by [facility] for the following reason(s):        No active prescription written by provider; [facility] action(s):  Approve  Defer   Requires appointment : Yes     Requires labs : Yes      Medication Therapy Plan:  AS OF  25      Appointments  past 12m or future 3m with PCP    Date Provider   Last Visit   2024 Elias Rowan MD   Next Visit   Visit date not found Elias Rowan MD   ED visits in past 90 days: 0        Note composed:10:55 AM 2025

## 2025-03-24 DIAGNOSIS — Z00.00 ENCOUNTER FOR MEDICARE ANNUAL WELLNESS EXAM: ICD-10-CM

## 2025-04-02 DIAGNOSIS — A49.9 BACTERIAL INFECTION: Primary | ICD-10-CM

## 2025-04-02 RX ORDER — SULFAMETHOXAZOLE AND TRIMETHOPRIM 800; 160 MG/1; MG/1
1 TABLET ORAL 2 TIMES DAILY
Qty: 28 TABLET | Refills: 0 | Status: SHIPPED | OUTPATIENT
Start: 2025-04-02 | End: 2025-04-16

## 2025-04-15 ENCOUNTER — PATIENT MESSAGE (OUTPATIENT)
Dept: NEUROLOGY | Facility: CLINIC | Age: 80
End: 2025-04-15
Payer: MEDICARE

## 2025-05-06 ENCOUNTER — OFFICE VISIT (OUTPATIENT)
Dept: FAMILY MEDICINE | Facility: CLINIC | Age: 80
End: 2025-05-06
Payer: MEDICARE

## 2025-05-06 VITALS — SYSTOLIC BLOOD PRESSURE: 136 MMHG | DIASTOLIC BLOOD PRESSURE: 82 MMHG

## 2025-05-06 DIAGNOSIS — Z79.899 ENCOUNTER FOR LONG-TERM (CURRENT) USE OF MEDICATIONS: ICD-10-CM

## 2025-05-06 DIAGNOSIS — I10 HYPERTENSION, UNSPECIFIED TYPE: Primary | ICD-10-CM

## 2025-05-06 DIAGNOSIS — E78.5 HYPERLIPIDEMIA, UNSPECIFIED HYPERLIPIDEMIA TYPE: ICD-10-CM

## 2025-05-06 DIAGNOSIS — M19.90 ARTHRITIS: ICD-10-CM

## 2025-05-06 PROCEDURE — 3079F DIAST BP 80-89 MM HG: CPT | Mod: CPTII,95,, | Performed by: FAMILY MEDICINE

## 2025-05-06 PROCEDURE — 1159F MED LIST DOCD IN RCRD: CPT | Mod: CPTII,95,, | Performed by: FAMILY MEDICINE

## 2025-05-06 PROCEDURE — 1160F RVW MEDS BY RX/DR IN RCRD: CPT | Mod: CPTII,95,, | Performed by: FAMILY MEDICINE

## 2025-05-06 PROCEDURE — 98006 SYNCH AUDIO-VIDEO EST MOD 30: CPT | Mod: 95,,, | Performed by: FAMILY MEDICINE

## 2025-05-06 PROCEDURE — 3075F SYST BP GE 130 - 139MM HG: CPT | Mod: CPTII,95,, | Performed by: FAMILY MEDICINE

## 2025-05-06 PROCEDURE — G2211 COMPLEX E/M VISIT ADD ON: HCPCS | Mod: 95,,, | Performed by: FAMILY MEDICINE

## 2025-05-06 RX ORDER — OXYCODONE AND ACETAMINOPHEN 5; 325 MG/1; MG/1
1 TABLET ORAL EVERY 6 HOURS PRN
COMMUNITY
Start: 2025-04-10

## 2025-05-06 RX ORDER — AMLODIPINE BESYLATE 5 MG/1
5 TABLET ORAL DAILY
Qty: 90 TABLET | Refills: 4 | Status: SHIPPED | OUTPATIENT
Start: 2025-05-06

## 2025-05-06 RX ORDER — DICLOFENAC SODIUM 75 MG/1
75 TABLET, DELAYED RELEASE ORAL 2 TIMES DAILY
Qty: 60 TABLET | Refills: 12 | Status: SHIPPED | OUTPATIENT
Start: 2025-05-06

## 2025-05-06 RX ORDER — METOPROLOL SUCCINATE 25 MG/1
12.5 TABLET, EXTENDED RELEASE ORAL DAILY
Qty: 45 TABLET | Refills: 4 | Status: SHIPPED | OUTPATIENT
Start: 2025-05-06 | End: 2026-07-30

## 2025-05-06 RX ORDER — VALACYCLOVIR HYDROCHLORIDE 1 G/1
1000 TABLET, FILM COATED ORAL 2 TIMES DAILY
Qty: 60 TABLET | Refills: 11 | Status: SHIPPED | OUTPATIENT
Start: 2025-05-06 | End: 2026-05-06

## 2025-05-06 RX ORDER — ATORVASTATIN CALCIUM 20 MG/1
20 TABLET, FILM COATED ORAL DAILY
Qty: 90 TABLET | Refills: 4 | Status: SHIPPED | OUTPATIENT
Start: 2025-05-06

## 2025-05-06 RX ORDER — OLMESARTAN MEDOXOMIL 20 MG/1
20 TABLET ORAL DAILY
Qty: 90 TABLET | Refills: 4 | Status: SHIPPED | OUTPATIENT
Start: 2025-05-06

## 2025-05-06 NOTE — PATIENT INSTRUCTIONS
Follow up in about 1 year (around 5/6/2026), or if symptoms worsen or fail to improve, for Annual Wellness Exam.     Dear patient,   As a result of recent federal legislation (The Federal Cures Act), you may receive lab or pathology results from your visit in your [facility] account before your physician is able to contact you. Your physician or their representative will relay the results to you with their recommendations at their soonest availability.     If no improvement in symptoms or symptoms worsen, please be advised to call MD, follow-up at clinic and/or go to ER if becomes severe.    Elias Rowan M.D.        We Offer TELEHEALTH & Same Day Appointments!   Book your Telehealth appointment with me through my nurse or   Clinic appointments on [employer]!    [address] [place] [address]    Office: [phone]   FAX: [phone]    Check out my Facebook Page and Follow Me at: [email or web address]    Check out my website at [employer] by clicking on: [email or web address]    To Schedule appointments online, go to [employer]: [email or web address]

## 2025-05-06 NOTE — ASSESSMENT & PLAN NOTE
Try diclofenac tablets.  Patient may take tramadol or oxycodone as needed for severe pain.  Consider x-ray, occupational and/or orthopedic consult if no improvement.

## 2025-05-06 NOTE — PROGRESS NOTES
Primary Care Telemedicine Note  The patient location is:  Patient's Home - [place]  The chief complaint leading to consultation is:   Chief Complaint   Patient presents with    Follow-up     Annual checkup      Total time:  see MDM below. The total time spent on the encounter, which includes face to face time and non-face to face time preparing to see the patient (eg, review of previous medical records, tests), Obtaining and/or reviewing separately obtained history, documenting clinical information in the electronic or other health record, independently interpreting results (not separately reported)/communicating results to the patient/family/caregiver, and/or care coordination (not separately reported).    Visit type: Virtual visit with synchronous audio and video  Each patient to whom he or she provides medical services by telemedicine is:  (1) informed of the relationship between the physician and patient and the respective role of any other health care provider with respect to management of the patient; and (2) notified that he or she may decline to receive medical services by telemedicine and may withdraw from such care at any time.  =================================================================  PLAN:      Assessment & Plan  1. Arthritis.  - Reports arthritis primarily in his hands and has previously tried Celebrex, meloxicam, and ibuprofen.  - Expressed interest in trying diclofenac.  - Prescription for diclofenac 75 mg extended release, to be taken twice daily, will be provided.  - Advised to monitor for any gastrointestinal side effects and to take the medication with food or consider using Protonix if needed.    2. Hypertension.  - Blood pressure is well-controlled with his current medication regimen, which includes Norvasc, Benicar, and amlodipine.  - Refills for these medications will be sent to pharmacy.  - Last blood work was on 04/20/2025.  - Advised to complete labs before next appointment on  06/26/2025.    3. Heart condition.  - Currently on metoprolol 12.5 mg for heart condition, which appears to be effective.  - Refill for metoprolol will be provided.  - Advised that metoprolol slows the heart slightly, providing benefit.    4. Health maintenance.  - Due for a second dose of the pneumonia vaccine and has never received the shingles vaccine.  - Encouraged to get both vaccines.  - Lab orders including magnesium, CMP, A1c, blood counts, kidney function, liver function, thyroid function, and cholesterol levels will be sent to [facility].  - Advised to complete these labs before next appointment on 06/26/2025.    Problem List Items Addressed This Visit       Encounter for long-term (current) use of medications (Chronic)    Complete history and limited telemedicine physical was completed today.  Complete and thorough medication reconciliation was performed.  Discussed risks and benefits of medications.  Advised patient on orders and health maintenance.  We discussed old records and old labs if available.  Will request any records not available through epic.  Continue current medications listed on your summary sheet.           Relevant Medications    atorvastatin (LIPITOR) 20 MG tablet    Other Relevant Orders    Magnesium    Lipid Panel    Hemoglobin A1C    TSH    Comprehensive Metabolic Panel    CBC Without Differential    Hypertension - Primary (Chronic)    May 2025:  Blood pressure well controlled.  Previous cough on ACE inhibitor  Avoid ACE inhibitor.  Counseled on importance of hypertension disease course, I recommend ongoing Education for DASH-diet and exercise.  Counseled on medication regimen importance of treating high blood pressure.  Please be advised of risk of untreated blood pressure as discussed.  Please advised of ER precautions were given for symptoms of hypertensive urgency and emergency.           Relevant Medications    amLODIPine (NORVASC) 5 MG tablet    metoprolol succinate (TOPROL-XL) 25  MG 24 hr tablet    olmesartan (BENICAR) 20 MG tablet    Other Relevant Orders    Magnesium    Lipid Panel    Hemoglobin A1C    TSH    Comprehensive Metabolic Panel    CBC Without Differential    Hyperlipidemia (Chronic)    LDL less than goal.  Update labs.Counseled on hyperlipidemia disease course, healthy diet and increased need for exercise.  Please be advised of the risk of cardiovascular disease, increase stroke and heart attack risk with uncontrolled/untreated hyperlipidemia.     Patient voiced understanding and understood the treatment plan. All questions were answered.              Relevant Medications    atorvastatin (LIPITOR) 20 MG tablet    Other Relevant Orders    Magnesium    Lipid Panel    Hemoglobin A1C    TSH    Comprehensive Metabolic Panel    CBC Without Differential    Arthritis (Chronic)    Try diclofenac tablets.  Patient may take tramadol or oxycodone as needed for severe pain.  Consider x-ray, occupational and/or orthopedic consult if no improvement.         Relevant Medications    diclofenac (VOLTAREN) 75 MG EC tablet    Other Relevant Orders    Magnesium    Lipid Panel    Hemoglobin A1C    TSH    Comprehensive Metabolic Panel    CBC Without Differential     Future Appointments       Date Provider Specialty Appt Notes    6/26/2025 Salina Berumen MD Cardiology 6 month fu           Medication Management for assessment above:   Medication List with Changes/Refills   New Medications    DICLOFENAC (VOLTAREN) 75 MG EC TABLET    Take 1 tablet (75 mg total) by mouth 2 (two) times daily.   Current Medications    ASCORBIC ACID, VITAMIN C, (VITAMIN C) 500 MG TABLET    Take 500 mg by mouth once daily.    ASPIRIN (ECOTRIN) 81 MG EC TABLET    Take 1 tablet (81 mg total) by mouth once daily.    DICLOFENAC SODIUM (VOLTAREN) 1 % GEL    Apply 2 g topically once daily.    FISH OIL-OMEGA-3 FATTY ACIDS 300-1,000 MG CAPSULE    Take by mouth once daily.    FOLIC ACID (FOLVITE) 1 MG TABLET    Take 1 mg by mouth once  daily.    OXYCODONE-ACETAMINOPHEN (PERCOCET) 5-325 MG PER TABLET    Take 1 tablet by mouth every 6 (six) hours as needed.    SAW PALMETTO FRUIT 450 MG CAP    Take 4 capsules by mouth.    SCOPOLAMINE (TRANSDERM-SCOP) 1.3-1.5 MG (1 MG OVER 3 DAYS)    Place 1 patch onto the skin every 72 hours.    VITAMIN D (VITAMIN D3) 1000 UNITS TAB    Take 1,000 Units by mouth once daily.    ZINC GLUCONATE 50 MG TABLET    Take 50 mg by mouth once daily.   Changed and/or Refilled Medications    Modified Medication Previous Medication    AMLODIPINE (NORVASC) 5 MG TABLET amLODIPine (NORVASC) 5 MG tablet       Take 1 tablet (5 mg total) by mouth once daily.    TAKE 1 TABLET DAILY FOR BLOOD PRESSURE *THANK YOU*    ATORVASTATIN (LIPITOR) 20 MG TABLET atorvastatin (LIPITOR) 20 MG tablet       Take 1 tablet (20 mg total) by mouth once daily.    TAKE 1 TABLET DAILY FOR CHOLESTEROL *THANK YOU*    METOPROLOL SUCCINATE (TOPROL-XL) 25 MG 24 HR TABLET metoprolol succinate (TOPROL-XL) 25 MG 24 hr tablet       Take 0.5 tablets (12.5 mg total) by mouth once daily.    Take 0.5 tablets (12.5 mg total) by mouth once daily.    OLMESARTAN (BENICAR) 20 MG TABLET olmesartan (BENICAR) 20 MG tablet       Take 1 tablet (20 mg total) by mouth once daily.    Take 1 tablet (20 mg total) by mouth once daily.    VALACYCLOVIR (VALTREX) 1000 MG TABLET valACYclovir (VALTREX) 1000 MG tablet       Take 1 tablet (1,000 mg total) by mouth 2 (two) times daily.    Take 1 tablet (1,000 mg total) by mouth 2 (two) times daily.       Elias Rowan M.D.  ==========================================================================  Subjective:   Patient ID: Trell Rios is a 79 y.o. male.  has a past medical history of Encounter for long-term (current) use of medications (10/17/2019), Hyperlipidemia (10/17/2019), and Hypertension.   Chief Complaint: Follow-up (Annual checkup)      History of Present Illness  The patient is a 79-year-old male being seen by telemedicine for  medication refills and lab orders.    He reports overall good health, with the exception of arthritis in his hands. He has previously tried Celebrex, meloxicam, and ibuprofen, but not diclofenac. He expresses a desire to initiate treatment with diclofenac 75 mg extended release, to be taken twice daily. He also mentions the potential need for tramadol in the future if the pain becomes severe.    His blood pressure has been well-controlled. He is currently on Norvasc and Benicar, which have been effective in managing his condition. Additionally, he is taking metoprolol 12.5 mg for a heart condition.    He has an upcoming appointment with another physician in a few months and prefers to have his labs done at that time. He recalls receiving a pneumonia vaccine in 2021 and was informed that it was the final dose. He has not yet received the shingles vaccine.    Problem List Items Addressed This Visit       Encounter for long-term (current) use of medications (Chronic)    Overview   May 2025:  Reviewed labs.  February 2024:  Reviewed labs.  December 2022: Reviewed labs.  CHRONIC long-term drug therapy for managed conditions. See medication list. Reports compliance.  No side effects reported.  Routine lab work is being monitored.  Patient does need refills today.   November 2021:CHRONIC. Stable. Compliant with medications for managed conditions. See medication list. No SE reported.   Routine lab analysis is being monitored. Refills were addressed.  Lab Results   Component Value Date    WBC 4.5 04/20/2024    HGB 13.5 04/20/2024    HCT 40.1 04/20/2024    MCV 96.6 04/20/2024     04/20/2024         Chemistry        Component Value Date/Time     04/20/2024 0745    K 4.1 04/20/2024 0745     04/20/2024 0745    CO2 26 04/20/2024 0745    BUN 23 04/20/2024 0745    CREATININE 0.98 04/20/2024 0745     (H) 04/20/2024 0745        Component Value Date/Time    CALCIUM 9.5 04/20/2024 0745    ALKPHOS 99 10/17/2019  1200    AST 22 04/20/2024 0745    ALT 28 04/20/2024 0745    BILITOT 0.5 04/20/2024 0745    ESTGFRAFRICA 98 12/31/2020 0759    EGFRNONAA 84 12/31/2020 0759          Lab Results   Component Value Date    TSH 1.14 04/20/2024              Current Assessment & Plan   Complete history and limited telemedicine physical was completed today.  Complete and thorough medication reconciliation was performed.  Discussed risks and benefits of medications.  Advised patient on orders and health maintenance.  We discussed old records and old labs if available.  Will request any records not available through epic.  Continue current medications listed on your summary sheet.           Hypertension - Primary (Chronic)    Overview   May 2025:  Hypertension Medications              amLODIPine (NORVASC) 5 MG tablet Take 1 tablet (5 mg total) by mouth once daily.    metoprolol succinate (TOPROL-XL) 25 MG 24 hr tablet Take 0.5 tablets (12.5 mg total) by mouth once daily.    olmesartan (BENICAR) 20 MG tablet Take 1 tablet (20 mg total) by mouth once daily.          December 2022: Patient reports that he had a dry hacking cough for several months.  Patient held lisinopril and the cough went away.  Patient's blood pressure remains less than 140/90 off of the medication.  December 2020:  Patient reports blood pressure log has been averaging 130/80.  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  Blood pressure cuff has been validated.  November 2021:  Reports blood pressure well controlled  CHRONIC. STABLE. BP Reviewed.  Compliant with BP medications. No SE reported.   (-) CP, SOB, palpitations, dizziness, lightheadedness, HA, arm numbness, tingling or weakness, syncope.  Creatinine   Date Value Ref Range Status   04/20/2024 0.98 0.70 - 1.28 mg/dL Final     Results for orders placed or performed in visit on 02/22/24   IN OFFICE EKG 12-LEAD (to [place])    Collection Time: 02/22/24  8:46 AM   Result Value Ref Range     QRS Duration 126 ms    OHS QTC Calculation 436 ms    Narrative    Test Reason : R94.31,    Vent. Rate : 082 BPM     Atrial Rate : 082 BPM     P-R Int : 186 ms          QRS Dur : 126 ms      QT Int : 374 ms       P-R-T Axes : 057 013 043 degrees     QTc Int : 436 ms    Normal sinus rhythm  Right bundle branch block  When compared with ECG of 08-FEB-2024 13:43,  No significant change was found  Confirmed by Octavio Carrasco MD ([id number]) on 2/26/2024 6:50:18 PM    Referred By: RALPH CHO           Confirmed By:Octavio Carrasco MD              Current Assessment & Plan   May 2025:  Blood pressure well controlled.  Previous cough on ACE inhibitor  Avoid ACE inhibitor.  Counseled on importance of hypertension disease course, I recommend ongoing Education for DASH-diet and exercise.  Counseled on medication regimen importance of treating high blood pressure.  Please be advised of risk of untreated blood pressure as discussed.  Please advised of ER precautions were given for symptoms of hypertensive urgency and emergency.           Hyperlipidemia (Chronic)    Overview   May 2025:  Hyperlipidemia Medications              fish oil-omega-3 fatty acids 300-1,000 mg capsule Take by mouth once daily.    atorvastatin (LIPITOR) 20 MG tablet Take 1 tablet (20 mg total) by mouth once daily.            CHRONIC. STABLE. Lab analysis reviewed.   (-) CP, SOB, abdominal pain, N/V/D, constipation, jaundice, skin changes.  (-) Myalgias  Lab Results   Component Value Date    CHOL 126 04/20/2024    CHOL 98 08/11/2022    CHOL 135 12/31/2020     Lab Results   Component Value Date    HDL 52 04/20/2024    HDL 54 08/11/2022    HDL 56 12/31/2020     Lab Results   Component Value Date    LDLCALC 54 04/20/2024    LDLCALC 31 08/11/2022    LDLCALC 58 12/31/2020     Lab Results   Component Value Date    TRIG 110 04/20/2024    TRIG 57 08/11/2022    TRIG 126 12/31/2020     Lab Results   Component Value Date    CHOLHDL 2.4 04/20/2024    CHOLHDL 1.8  "08/11/2022    CHOLHDL 2.4 12/31/2020     No results found for: "TOTALCHOLEST"  Lab Results   Component Value Date    ALT 28 04/20/2024    AST 22 04/20/2024    ALKPHOS 99 10/17/2019    BILITOT 0.5 04/20/2024     ======================================================  The ASCVD Risk score (Josephine HURD, et al., 2019) failed to calculate for the following reasons:    The valid total cholesterol range is 130 to 320 mg/dL             Current Assessment & Plan   LDL less than goal.  Update labs.Counseled on hyperlipidemia disease course, healthy diet and increased need for exercise.  Please be advised of the risk of cardiovascular disease, increase stroke and heart attack risk with uncontrolled/untreated hyperlipidemia.     Patient voiced understanding and understood the treatment plan. All questions were answered.              Arthritis (Chronic)    Overview   Chronic.  Intermittent control.  Patient has tried different medications over-the-counter.  He has used Voltaren gel topically.  Mostly in his hands.         Current Assessment & Plan   Try diclofenac tablets.  Patient may take tramadol or oxycodone as needed for severe pain.  Consider x-ray, occupational and/or orthopedic consult if no improvement.             Review of patient's allergies indicates:   Allergen Reactions    Lisinopril     Sulfa (sulfonamide antibiotics) Rash     Current Outpatient Medications   Medication Instructions    amLODIPine (NORVASC) 5 mg, Oral, Daily    ascorbic acid (vitamin C) (VITAMIN C) 500 mg, Daily    aspirin (ECOTRIN) 81 mg, Oral, Daily    atorvastatin (LIPITOR) 20 mg, Oral, Daily    diclofenac (VOLTAREN) 75 mg, Oral, 2 times daily    diclofenac sodium (VOLTAREN) 2 g, Topical (Top), Daily    fish oil-omega-3 fatty acids 300-1,000 mg capsule Daily    folic acid (FOLVITE) 1 mg, Daily    metoprolol succinate (TOPROL-XL) 12.5 mg, Oral, Daily    olmesartan (BENICAR) 20 mg, Oral, Daily    oxyCODONE-acetaminophen (PERCOCET) 5-325 mg per " tablet 1 tablet, Every 6 hours PRN    saw palmetto fruit 450 mg Cap 4 capsules    scopolamine (TRANSDERM-SCOP) 1.3-1.5 mg (1 mg over 3 days) 1 patch, Transdermal, Every 72 hours    valACYclovir (VALTREX) 1,000 mg, Oral, 2 times daily    vitamin D (VITAMIN D3) 1,000 Units, Daily    zinc gluconate 50 mg, Daily      I have reviewed the PMH, social history, FamilyHx, surgical history, allergies and medications documented / confirmed by the patient at the time of this visit.  Review of Systems   Constitutional:  Negative for activity change and unexpected weight change.   HENT:  Negative for hearing loss, rhinorrhea and trouble swallowing.    Eyes:  Negative for discharge and visual disturbance.   Respiratory:  Negative for chest tightness and wheezing.    Cardiovascular:  Negative for chest pain and palpitations.   Gastrointestinal:  Negative for blood in stool, constipation, diarrhea and vomiting.   Endocrine: Negative for polydipsia and polyuria.   Genitourinary:  Negative for difficulty urinating, hematuria and urgency.   Musculoskeletal:  Positive for arthralgias. Negative for joint swelling and neck pain.   Neurological:  Negative for weakness and headaches.   Psychiatric/Behavioral:  Negative for confusion and dysphoric mood.      Objective:   /82   Physical Exam  Constitutional:       General: He is not in acute distress.     Appearance: He is well-developed. He is not diaphoretic.   HENT:      Head: Normocephalic and atraumatic.   Eyes:      Extraocular Movements: Extraocular movements intact.      Pupils: Pupils are equal, round, and reactive to light.   Pulmonary:      Effort: Pulmonary effort is normal.   Musculoskeletal:         General: Normal range of motion.      Cervical back: Normal range of motion.   Skin:     Findings: No rash.   Neurological:      Mental Status: He is alert and oriented to person, place, and time.   Psychiatric:         Attention and Perception: He is attentive.         Mood  and Affect: Mood normal. Mood is not anxious or depressed. Affect is not labile, blunt, angry or inappropriate.         Speech: He is communicative. Speech is not rapid and pressured, delayed, slurred or tangential.         Behavior: Behavior normal. Behavior is not agitated, slowed, aggressive, withdrawn, hyperactive or combative.         Thought Content: Thought content normal. Thought content is not paranoid or delusional. Thought content does not include homicidal or suicidal ideation. Thought content does not include homicidal or suicidal plan.         Cognition and Memory: Memory is not impaired.         Judgment: Judgment normal. Judgment is not impulsive or inappropriate.       Physical Exam      Results      Assessment:     1. Hypertension, unspecified type    2. Encounter for long-term (current) use of medications    3. Hyperlipidemia, unspecified hyperlipidemia type    4. Arthritis      MDM:   Moderate medical complexity.  Moderate risk.  Total time: 33 minutes.  This includes total time spent on the encounter, which includes face to face time and non-face to face time preparing to see the patient (eg, review of previous medical records, tests), Obtaining and/or reviewing separately obtained history, documenting clinical information in the electronic or other health record, independently interpreting results (not separately reported)/communicating results to the patient/family/caregiver, and/or care coordination (not separately reported).    I have Reviewed and summarized old records.  I have performed thorough medication reconciliation today and discussed risk and benefits of medications.  I have reviewed labs and discussed with patient.  All questions were answered.  I am requesting old records and will review them once they are available.  Cardiology  Visit today included increased complexity associated with the care of the episodic problem see above assessment addressed and managing the longitudinal  care of the patient due to the serious and/or complex managed problem(s) see above.The patient was checked in the [facility] [employer] of Pharmacy's  Prescription Monitoring Program. There appears to be no incongruities with the patient's verbalized history.       I have signed for the following orders AND/OR meds.  Orders Placed This Encounter   Procedures    Magnesium     Standing Status:   Future     Number of Occurrences:   1     Expected Date:   5/6/2025     Expiration Date:   7/5/2026     Send normal result to authorizing provider's In Basket if patient is active on MyChart::   Yes    Lipid Panel     Standing Status:   Future     Number of Occurrences:   1     Expected Date:   5/6/2025     Expiration Date:   7/5/2026    Hemoglobin A1C     Standing Status:   Future     Number of Occurrences:   1     Expected Date:   5/6/2025     Expiration Date:   7/5/2026    TSH     Standing Status:   Future     Number of Occurrences:   1     Expected Date:   5/6/2025     Expiration Date:   7/5/2026    Comprehensive Metabolic Panel     Standing Status:   Future     Number of Occurrences:   1     Expected Date:   5/6/2025     Expiration Date:   7/5/2026    CBC Without Differential     Standing Status:   Future     Number of Occurrences:   1     Expected Date:   5/6/2025     Expiration Date:   7/5/2026     Medications Ordered This Encounter   Medications    amLODIPine (NORVASC) 5 MG tablet     Sig: Take 1 tablet (5 mg total) by mouth once daily.     Dispense:  90 tablet     Refill:  4     .    atorvastatin (LIPITOR) 20 MG tablet     Sig: Take 1 tablet (20 mg total) by mouth once daily.     Dispense:  90 tablet     Refill:  4    diclofenac (VOLTAREN) 75 MG EC tablet     Sig: Take 1 tablet (75 mg total) by mouth 2 (two) times daily.     Dispense:  60 tablet     Refill:  12    metoprolol succinate (TOPROL-XL) 25 MG 24 hr tablet     Sig: Take 0.5 tablets (12.5 mg total) by mouth once daily.     Dispense:  45 tablet     Refill:  4      .    olmesartan (BENICAR) 20 MG tablet     Sig: Take 1 tablet (20 mg total) by mouth once daily.     Dispense:  90 tablet     Refill:  4     .    valACYclovir (VALTREX) 1000 MG tablet     Sig: Take 1 tablet (1,000 mg total) by mouth 2 (two) times daily.     Dispense:  60 tablet     Refill:  11        Follow up in about 1 year (around 5/6/2026), or if symptoms worsen or fail to improve, for Annual Wellness Exam.  Future Appointments       Date Provider Specialty Appt Notes    6/26/2025 Salina Berumen MD Cardiology 6 month fu          If no improvement in symptoms or symptoms worsen, advised to call/follow-up at clinic or go to ER. Patient voiced understanding and all questions/concerns were addressed.   DISCLAIMER: This note was compiled by using a speech recognition dictation system and therefore please be aware that typographical / speech recognition errors can and do occur.  Please contact me if you see any errors specifically.  Consent was obtained for [facility] recording system prior to the visit.    This note was generated with the assistance of ambient listening technology. Verbal consent was obtained by the patient and accompanying visitor(s) for the recording of patient appointment to facilitate this note. I attest to having reviewed and edited the generated note for accuracy, though some syntax or spelling errors may persist. Please contact the author of this note for any clarification.    Elias Rowan M.D.       Office: [phone] [address] [place] [address]  FAX: [phone]

## 2025-05-06 NOTE — ASSESSMENT & PLAN NOTE
May 2025:  Blood pressure well controlled.  Previous cough on ACE inhibitor  Avoid ACE inhibitor.  Counseled on importance of hypertension disease course, I recommend ongoing Education for DASH-diet and exercise.  Counseled on medication regimen importance of treating high blood pressure.  Please be advised of risk of untreated blood pressure as discussed.  Please advised of ER precautions were given for symptoms of hypertensive urgency and emergency.

## 2025-05-06 NOTE — ASSESSMENT & PLAN NOTE
LDL less than goal.  Update labs.Counseled on hyperlipidemia disease course, healthy diet and increased need for exercise.  Please be advised of the risk of cardiovascular disease, increase stroke and heart attack risk with uncontrolled/untreated hyperlipidemia.     Patient voiced understanding and understood the treatment plan. All questions were answered.

## 2025-05-29 DIAGNOSIS — I10 HYPERTENSION, UNSPECIFIED TYPE: ICD-10-CM

## 2025-05-29 RX ORDER — METOPROLOL SUCCINATE 25 MG/1
TABLET, EXTENDED RELEASE ORAL
Qty: 45 TABLET | Refills: 1 | Status: SHIPPED | OUTPATIENT
Start: 2025-05-29

## 2025-08-05 DIAGNOSIS — M19.90 ARTHRITIS: Primary | ICD-10-CM

## 2025-08-05 RX ORDER — TRAMADOL HYDROCHLORIDE 50 MG/1
50 TABLET, FILM COATED ORAL EVERY 6 HOURS PRN
Qty: 28 EACH | Refills: 0 | Status: SHIPPED | OUTPATIENT
Start: 2025-08-05